# Patient Record
Sex: MALE | Race: WHITE | NOT HISPANIC OR LATINO | URBAN - METROPOLITAN AREA
[De-identification: names, ages, dates, MRNs, and addresses within clinical notes are randomized per-mention and may not be internally consistent; named-entity substitution may affect disease eponyms.]

---

## 2017-07-14 ENCOUNTER — APPOINTMENT (OUTPATIENT)
Dept: URBAN - METROPOLITAN AREA CLINIC 200 | Age: 52
Setting detail: DERMATOLOGY
End: 2017-07-25

## 2017-07-14 DIAGNOSIS — L57.8 OTHER SKIN CHANGES DUE TO CHRONIC EXPOSURE TO NONIONIZING RADIATION: ICD-10-CM

## 2017-07-14 PROBLEM — L20.84 INTRINSIC (ALLERGIC) ECZEMA: Status: ACTIVE | Noted: 2017-07-14

## 2017-07-14 PROBLEM — D23.5 OTHER BENIGN NEOPLASM OF SKIN OF TRUNK: Status: ACTIVE | Noted: 2017-07-14

## 2017-07-14 PROCEDURE — OTHER COUNSELING: OTHER

## 2017-07-14 PROCEDURE — 99202 OFFICE O/P NEW SF 15 MIN: CPT

## 2017-07-14 ASSESSMENT — LOCATION SIMPLE DESCRIPTION DERM: LOCATION SIMPLE: TRAPEZIAL NECK

## 2017-07-14 ASSESSMENT — LOCATION ZONE DERM: LOCATION ZONE: NECK

## 2017-07-14 ASSESSMENT — LOCATION DETAILED DESCRIPTION DERM: LOCATION DETAILED: MID TRAPEZIAL NECK

## 2018-04-26 ENCOUNTER — TELEPHONE (OUTPATIENT)
Dept: FAMILY MEDICINE | Facility: CLINIC | Age: 53
End: 2018-04-26

## 2018-04-26 NOTE — TELEPHONE ENCOUNTER
cialis was prescribed and ins is not covering  The ins told him theat viagra would be covered. Can we call into pharmacy      [phone]-[phone]

## 2018-04-26 NOTE — TELEPHONE ENCOUNTER
Called patient and lmom that this medication  Will have to wait until dr johnson gets back in the office on Monday

## 2018-05-02 ENCOUNTER — TELEPHONE (OUTPATIENT)
Dept: FAMILY MEDICINE | Facility: CLINIC | Age: 53
End: 2018-05-02

## 2018-05-02 RX ORDER — SILDENAFIL 100 MG/1
100 TABLET, FILM COATED ORAL DAILY PRN
Qty: 10 TABLET | Refills: 5 | Status: SHIPPED | OUTPATIENT
Start: 2018-05-02 | End: 2019-07-02

## 2018-05-02 NOTE — TELEPHONE ENCOUNTER
cialis was not covered  With his ins he [facility]

## 2018-05-02 NOTE — TELEPHONE ENCOUNTER
I sent viagra to pharmacy. I rx 100 mg but 50 mg may be effective and he can cut pill in 1/2 and have less expense that way.

## 2018-07-20 ENCOUNTER — APPOINTMENT (OUTPATIENT)
Dept: URBAN - METROPOLITAN AREA CLINIC 200 | Age: 53
Setting detail: DERMATOLOGY
End: 2018-07-20

## 2018-07-20 DIAGNOSIS — B07.8 OTHER VIRAL WARTS: ICD-10-CM

## 2018-07-20 DIAGNOSIS — L57.8 OTHER SKIN CHANGES DUE TO CHRONIC EXPOSURE TO NONIONIZING RADIATION: ICD-10-CM

## 2018-07-20 PROCEDURE — 17110 DESTRUCT B9 LESION 1-14: CPT

## 2018-07-20 PROCEDURE — 99213 OFFICE O/P EST LOW 20 MIN: CPT | Mod: 25

## 2018-07-20 PROCEDURE — OTHER LIQUID NITROGEN: OTHER

## 2018-07-20 PROCEDURE — OTHER COUNSELING: OTHER

## 2018-07-20 ASSESSMENT — LOCATION ZONE DERM
LOCATION ZONE: LEG
LOCATION ZONE: TRUNK

## 2018-07-20 ASSESSMENT — LOCATION DETAILED DESCRIPTION DERM
LOCATION DETAILED: LEFT MEDIAL SUPERIOR CHEST
LOCATION DETAILED: LEFT ANTERIOR PROXIMAL THIGH

## 2018-07-20 ASSESSMENT — LOCATION SIMPLE DESCRIPTION DERM
LOCATION SIMPLE: CHEST
LOCATION SIMPLE: LEFT THIGH

## 2018-07-20 NOTE — PROCEDURE: LIQUID NITROGEN
Number Of Freeze-Thaw Cycles: 2 freeze-thaw cycles
Pared With?: Dermablade
Post-Care Instructions: I reviewed with the patient in detail post-care instructions. Patient is to wear sunprotection, and avoid picking at any of the treated lesions. Pt may apply Vaseline to crusted or scabbing areas.
Render Post-Care Instructions In Note?: no
Consent: The patient's consent was obtained including but not limited to risks of crusting, scabbing, blistering, scarring, darker or lighter pigmentary change, recurrence, incomplete removal and infection.
Medical Necessity Information: It is in your best interest to select a reason for this procedure from the list below. All of these items fulfill various CMS LCD requirements except the new and changing color options.
Medical Necessity Clause: This procedure was medically necessary because the lesions that were treated were: causing pain
Include Z78.9 (Other Specified Conditions Influencing Health Status) As An Associated Diagnosis?: Yes
Detail Level: Detailed

## 2018-09-24 ENCOUNTER — TELEPHONE (OUTPATIENT)
Dept: FAMILY MEDICINE | Facility: CLINIC | Age: 53
End: 2018-09-24

## 2018-09-24 RX ORDER — VALACYCLOVIR HYDROCHLORIDE 1 G/1
TABLET, FILM COATED ORAL
Qty: 16 TABLET | Refills: 2 | Status: SHIPPED | OUTPATIENT
Start: 2018-09-24 | End: 2018-10-23 | Stop reason: SDUPTHER

## 2018-10-23 ENCOUNTER — OFFICE VISIT (OUTPATIENT)
Dept: FAMILY MEDICINE | Facility: CLINIC | Age: 53
End: 2018-10-23
Payer: COMMERCIAL

## 2018-10-23 VITALS
WEIGHT: 203 LBS | HEART RATE: 67 BPM | DIASTOLIC BLOOD PRESSURE: 70 MMHG | TEMPERATURE: 98.2 F | OXYGEN SATURATION: 97 % | SYSTOLIC BLOOD PRESSURE: 116 MMHG

## 2018-10-23 DIAGNOSIS — Z23 NEED FOR PROPHYLACTIC VACCINATION AND INOCULATION AGAINST INFLUENZA: Primary | ICD-10-CM

## 2018-10-23 DIAGNOSIS — B00.1 COLD SORE: ICD-10-CM

## 2018-10-23 DIAGNOSIS — E78.00 HYPERCHOLESTEROLEMIA: ICD-10-CM

## 2018-10-23 DIAGNOSIS — N52.8 OTHER MALE ERECTILE DYSFUNCTION: ICD-10-CM

## 2018-10-23 PROCEDURE — 99213 OFFICE O/P EST LOW 20 MIN: CPT | Performed by: FAMILY MEDICINE

## 2018-10-23 RX ORDER — SILDENAFIL 100 MG/1
TABLET, FILM COATED ORAL
Refills: 5 | COMMUNITY
Start: 2018-10-03 | End: 2018-10-23 | Stop reason: SDUPTHER

## 2018-10-23 RX ORDER — SILDENAFIL 100 MG/1
100 TABLET, FILM COATED ORAL DAILY PRN
Qty: 10 TABLET | Refills: 5 | Status: SHIPPED | OUTPATIENT
Start: 2018-10-23 | End: 2021-10-14

## 2018-10-23 RX ORDER — ACETAMINOPHEN 500 MG
1 TABLET ORAL DAILY
COMMUNITY
Start: 2016-05-02 | End: 2023-04-20

## 2018-10-23 RX ORDER — ACETAMINOPHEN 500 MG
5 TABLET ORAL NIGHTLY
COMMUNITY
Start: 2015-07-09

## 2018-10-23 RX ORDER — OMEGA-3/DHA/EPA/FISH OIL 300-1000MG
1 CAPSULE,DELAYED RELEASE (ENTERIC COATED) ORAL DAILY
COMMUNITY
Start: 2016-05-02 | End: 2023-04-20

## 2018-10-23 RX ORDER — VALACYCLOVIR HYDROCHLORIDE 1 G/1
2000 TABLET, FILM COATED ORAL 2 TIMES DAILY
Qty: 16 TABLET | Refills: 2
Start: 2018-10-23 | End: 2023-04-20

## 2018-10-23 ASSESSMENT — ENCOUNTER SYMPTOMS
FATIGUE: 0
SHORTNESS OF BREATH: 0

## 2018-10-23 NOTE — PROGRESS NOTES
Patient ID: Des Moran is a 53 y.o. male.        Subjective     F/u med check. Needs refill. Weight fluctuates. Needs refill viagra. Working fine. And valtrex once every few months for cold sore. [place] in de[place] family trip.           The following have been reviewed and updated as appropriate in this visit:  Allergies  Meds  Problems       Patient Active Problem List   Diagnosis   • Seasonal allergies   • Hypercholesterolemia   • Other male erectile dysfunction   • Cold sore       Current Outpatient Prescriptions:   •  cholecalciferol, vitamin D3, 5,000 unit tablet, Take 1 tablet by mouth daily., Disp: , Rfl:   •  melatonin 5 mg tablet, Take 5 mg by mouth nightly., Disp: , Rfl:   •  multivitamin capsule, No SIG Entered, Disp: , Rfl:   •  omega 3-dha-epa-fish oil (FISH OIL) 300-1,000 mg capsule,delayed release(DR/EC), Take 1 capsule by mouth daily., Disp: , Rfl:   •  sildenafil (VIAGRA) 100 mg tablet, Take 1 tablet (100 mg total) by mouth daily as needed for erectile dysfunction., Disp: 10 tablet, Rfl: 5  •  valACYclovir (VALTREX) 1 gram tablet, Take 2 tablets (2,000 mg total) by mouth 2 (two) times a day., Disp: 16 tablet, Rfl: 2    Allergies   Allergen Reactions   • No Known Allergies      Review of Systems   Constitutional: Negative for fatigue.   Respiratory: Negative for shortness of breath.    Cardiovascular: Negative for chest pain.         Objective     Vitals:    10/23/18 0924   BP: 116/70   BP Location: Left upper arm   Patient Position: Sitting   Pulse: 67   Temp: 36.8 °C (98.2 °F)   TempSrc: Oral   SpO2: 97%   Weight: 92.1 kg (203 lb)     Physical Exam   Constitutional: He appears well-developed and well-nourished.   HENT:   Head: Normocephalic.   Mouth/Throat: Oropharynx is clear and moist.   Eyes: Pupils are equal, round, and reactive to light.   Neck: No thyromegaly present.   Cardiovascular: Normal rate, regular rhythm, normal heart sounds and intact distal pulses.    Pulmonary/Chest:  Effort normal and breath sounds normal.   Lymphadenopathy:     He has no cervical adenopathy.   Nursing note and vitals reviewed.      Assessment/Plan       Problem List Items Addressed This Visit        Other    Hypercholesterolemia    Relevant Medications    omega 3-dha-epa-fish oil (FISH OIL) 300-1,000 mg capsule,delayed release(DR/EC)    Other male erectile dysfunction    Relevant Medications    sildenafil (VIAGRA) 100 mg tablet    Cold sore    Relevant Medications    valACYclovir (VALTREX) 1 gram tablet      Other Visit Diagnoses     Need for prophylactic vaccination and inoculation against influenza    -  Primary      next week do lipid panel, cmp and flu shot

## 2019-07-02 ENCOUNTER — OFFICE VISIT (OUTPATIENT)
Dept: FAMILY MEDICINE | Facility: CLINIC | Age: 54
End: 2019-07-02
Payer: COMMERCIAL

## 2019-07-02 VITALS
BODY MASS INDEX: 27.55 KG/M2 | WEIGHT: 196.8 LBS | HEART RATE: 76 BPM | RESPIRATION RATE: 12 BRPM | DIASTOLIC BLOOD PRESSURE: 74 MMHG | TEMPERATURE: 98.5 F | SYSTOLIC BLOOD PRESSURE: 106 MMHG | HEIGHT: 71 IN

## 2019-07-02 DIAGNOSIS — Z00.00 WELL ADULT EXAM: Primary | ICD-10-CM

## 2019-07-02 DIAGNOSIS — N52.8 OTHER MALE ERECTILE DYSFUNCTION: ICD-10-CM

## 2019-07-02 DIAGNOSIS — E78.00 HYPERCHOLESTEROLEMIA: ICD-10-CM

## 2019-07-02 PROCEDURE — 99396 PREV VISIT EST AGE 40-64: CPT | Performed by: FAMILY MEDICINE

## 2019-07-02 RX ORDER — TADALAFIL 5 MG/1
5 TABLET ORAL DAILY
Qty: 30 TABLET | Refills: 11 | Status: SHIPPED | OUTPATIENT
Start: 2019-07-02 | End: 2023-04-20

## 2019-07-02 NOTE — PROGRESS NOTES
"   Patient ID: Des Moran is a 53 y.o. male.        Subjective     Wellness  Diet: reduced portions  Exercise: cardio /weights 3x/week and walking on there days.  c scope 6/2017  Doing mobility exercises.  Nocturia x 1 most nights. Viagra has been less effective. No incomplete voiding.           The following have been reviewed and updated as appropriate in this visit:  Tobacco  Med Hx  Surg Hx  Fam Hx  Soc Hx      Patient Active Problem List   Diagnosis   • Seasonal allergies   • Hypercholesterolemia   • Other male erectile dysfunction   • Cold sore       Outpatient Encounter Prescriptions as of 7/2/2019:   •  cholecalciferol, vitamin D3, 5,000 unit tablet, Take 1 tablet by mouth daily.  •  melatonin 5 mg tablet, Take 5 mg by mouth nightly.  •  multivitamin capsule, No SIG Entered  •  omega 3-dha-epa-fish oil (FISH OIL) 300-1,000 mg capsule,delayed release(DR/EC), Take 1 capsule by mouth daily.  •  sildenafil (VIAGRA) 100 mg tablet, Take 1 tablet (100 mg total) by mouth daily as needed for erectile dysfunction.  •  valACYclovir (VALTREX) 1 gram tablet, Take 2 tablets (2,000 mg total) by mouth 2 (two) times a day.  •  tadalafil (CIALIS) 5 mg tablet, Take 1 tablet (5 mg total) by mouth daily.  •  [DISCONTINUED] sildenafil (VIAGRA) 100 mg tablet, Take 1 tablet (100 mg total) by mouth daily as needed for erectile dysfunction.    Allergies   Allergen Reactions   • No Known Allergies      Review of Systems   Gastrointestinal:        BM more frequent but normal consistency   All other systems reviewed and are negative.        Objective     Vitals:    07/02/19 1052   BP: 106/74   BP Location: Left upper arm   Patient Position: Sitting   Pulse: 76   Resp: 12   Temp: 36.9 °C (98.5 °F)   TempSrc: Oral   Weight: 89.3 kg (196 lb 12.8 oz)   Height: 1.791 m (5' 10.5\")     Physical Exam   Constitutional: He appears well-developed and well-nourished.   HENT:   Head: Normocephalic.   Right Ear: Tympanic membrane and external ear " normal.   Left Ear: Tympanic membrane and external ear normal.   Mouth/Throat: Oropharynx is clear and moist.   Eyes: Pupils are equal, round, and reactive to light. Conjunctivae are normal.   Neck: Normal range of motion. No thyromegaly present.   Cardiovascular: Normal rate, regular rhythm, normal heart sounds and intact distal pulses.    No murmur heard.  Pulmonary/Chest: Effort normal and breath sounds normal. He has no wheezes.   Abdominal: Soft. Bowel sounds are normal. He exhibits no mass. There is no tenderness.   Musculoskeletal: He exhibits no edema or deformity.   Lymphadenopathy:     He has no cervical adenopathy.   Neurological: No sensory deficit. Coordination normal.   Skin: Skin is warm. Capillary refill takes less than 2 seconds. No rash noted. No erythema.   Psychiatric: He has a normal mood and affect.   Nursing note and vitals reviewed.      Assessment/Plan       Problem List Items Addressed This Visit        Endocrine/Metabolic    Hypercholesterolemia       Other    Other male erectile dysfunction     Switch to daily cialis         Relevant Orders    Testosterone, free, total      Other Visit Diagnoses     Well adult exam    -  Primary    Relevant Orders    Lipid panel    Testosterone, free, total    TSH 3rd Generation    CBC and Differential    Comprehensive metabolic panel    PSA      Exercise cardio increase  Healthy habits to continue

## 2019-08-23 ENCOUNTER — OFFICE VISIT (OUTPATIENT)
Dept: FAMILY MEDICINE | Facility: CLINIC | Age: 54
End: 2019-08-23
Payer: COMMERCIAL

## 2019-08-23 VITALS
BODY MASS INDEX: 28.14 KG/M2 | HEART RATE: 67 BPM | WEIGHT: 201 LBS | RESPIRATION RATE: 16 BRPM | HEIGHT: 71 IN | SYSTOLIC BLOOD PRESSURE: 142 MMHG | DIASTOLIC BLOOD PRESSURE: 88 MMHG | TEMPERATURE: 97.8 F

## 2019-08-23 DIAGNOSIS — S61.316A LACERATION OF RIGHT LITTLE FINGER WITHOUT FOREIGN BODY WITH DAMAGE TO NAIL, INITIAL ENCOUNTER: Primary | ICD-10-CM

## 2019-08-23 PROCEDURE — 99213 OFFICE O/P EST LOW 20 MIN: CPT | Performed by: FAMILY MEDICINE

## 2019-08-23 RX ORDER — CEPHALEXIN 500 MG/1
500 CAPSULE ORAL 4 TIMES DAILY
Qty: 20 CAPSULE | Refills: 0 | Status: SHIPPED | OUTPATIENT
Start: 2019-08-23 | End: 2019-08-28

## 2019-08-23 ASSESSMENT — ENCOUNTER SYMPTOMS
WEAKNESS: 0
NUMBNESS: 0

## 2019-08-23 NOTE — PROGRESS NOTES
"   Patient ID: Des Moran is a 53 y.o. male.        Subjective     Here for wound check/suture removal. Right distal 5th digit. Smashed it between 75 pound weight and floor. Finished last abx yesterday.      Suture / Staple Removal           The following have been reviewed and updated as appropriate in this visit:  Allergies  Problems       Patient Active Problem List   Diagnosis   • Seasonal allergies   • Hypercholesterolemia   • Other male erectile dysfunction   • Cold sore       Outpatient Encounter Prescriptions as of 2019:   •  cholecalciferol, vitamin D3, 5,000 unit tablet, Take 1 tablet by mouth daily.  •  melatonin 5 mg tablet, Take 5 mg by mouth nightly.  •  multivitamin capsule, No SIG Entered  •  omega 3-dha-epa-fish oil (FISH OIL) 300-1,000 mg capsule,delayed release(DR/EC), Take 1 capsule by mouth daily.  •  sildenafil (VIAGRA) 100 mg tablet, Take 1 tablet (100 mg total) by mouth daily as needed for erectile dysfunction.  •  valACYclovir (VALTREX) 1 gram tablet, Take 2 tablets (2,000 mg total) by mouth 2 (two) times a day.  •  [] cephalexin (KEFLEX) 500 mg capsule, Take 1 capsule (500 mg total) by mouth 4 (four) times a day for 5 days.  •  tadalafil (CIALIS) 5 mg tablet, Take 1 tablet (5 mg total) by mouth daily.    Allergies   Allergen Reactions   • No Known Allergies      Review of Systems   Neurological: Negative for weakness and numbness.         Objective     Vitals:    19 1133   BP: (!) 142/88   BP Location: Left upper arm   Patient Position: Sitting   Pulse: 67   Resp: 16   Temp: 36.6 °C (97.8 °F)   TempSrc: Oral   Weight: 91.2 kg (201 lb)   Height: 1.791 m (5' 10.5\")     Physical Exam   Constitutional: He appears well-developed and well-nourished.   Musculoskeletal:   Right 5th digit with edema and nail is loose. Has some ecchymosis under nail    Skin:   L shaped laceration along radial side of distal digit lateral to nail. Some avulsion of nail. Has serosanguinous drainage. " At L corner has some duskiness of skin. Mild proximal redness   Nursing note and vitals reviewed.      Assessment/Plan       Problem List Items Addressed This Visit     None      Visit Diagnoses     Laceration of right little finger without foreign body with damage to nail, initial encounter    -  Primary      will place on keflex. Return on 27th for suture removal. Not healed yet.

## 2019-08-27 ENCOUNTER — OFFICE VISIT (OUTPATIENT)
Dept: FAMILY MEDICINE | Facility: CLINIC | Age: 54
End: 2019-08-27
Payer: COMMERCIAL

## 2019-08-27 VITALS
BODY MASS INDEX: 28.5 KG/M2 | RESPIRATION RATE: 18 BRPM | DIASTOLIC BLOOD PRESSURE: 82 MMHG | WEIGHT: 203.6 LBS | HEART RATE: 72 BPM | SYSTOLIC BLOOD PRESSURE: 124 MMHG | HEIGHT: 71 IN | TEMPERATURE: 98.3 F

## 2019-08-27 DIAGNOSIS — S61.316A LACERATION OF RIGHT LITTLE FINGER WITHOUT FOREIGN BODY WITH DAMAGE TO NAIL, INITIAL ENCOUNTER: Primary | ICD-10-CM

## 2019-08-27 DIAGNOSIS — Z48.02 VISIT FOR SUTURE REMOVAL: ICD-10-CM

## 2019-08-27 PROCEDURE — 99213 OFFICE O/P EST LOW 20 MIN: CPT | Performed by: FAMILY MEDICINE

## 2019-08-27 ASSESSMENT — ENCOUNTER SYMPTOMS: WOUND: 1

## 2019-08-27 NOTE — PROGRESS NOTES
"   Patient ID: Des Moran is a 53 y.o. male.        Subjective     HPI    Patient presents for suture removal for laceration on right distal 5th digit. Has taken keflex since Saturday. Edema has improved. Pain only if he bumps it. No drainage. Pt continues with keflex.      The following have been reviewed and updated as appropriate in this visit:       Patient Active Problem List   Diagnosis   • Seasonal allergies   • Hypercholesterolemia   • Other male erectile dysfunction   • Cold sore       Outpatient Encounter Prescriptions as of 8/27/2019:   •  cephalexin (KEFLEX) 500 mg capsule, Take 1 capsule (500 mg total) by mouth 4 (four) times a day for 5 days.  •  cholecalciferol, vitamin D3, 5,000 unit tablet, Take 1 tablet by mouth daily.  •  melatonin 5 mg tablet, Take 5 mg by mouth nightly.  •  multivitamin capsule, No SIG Entered  •  omega 3-dha-epa-fish oil (FISH OIL) 300-1,000 mg capsule,delayed release(DR/EC), Take 1 capsule by mouth daily.  •  sildenafil (VIAGRA) 100 mg tablet, Take 1 tablet (100 mg total) by mouth daily as needed for erectile dysfunction.  •  tadalafil (CIALIS) 5 mg tablet, Take 1 tablet (5 mg total) by mouth daily.  •  valACYclovir (VALTREX) 1 gram tablet, Take 2 tablets (2,000 mg total) by mouth 2 (two) times a day.    Allergies   Allergen Reactions   • No Known Allergies      Review of Systems   Skin: Positive for wound (right distal 5th digit).   All other systems reviewed and are negative.        Objective     Vitals:    08/27/19 1035   BP: 124/82   BP Location: Left upper arm   Patient Position: Sitting   Pulse: 72   Resp: 18   Temp: 36.8 °C (98.3 °F)   TempSrc: Oral   Weight: 92.4 kg (203 lb 9.6 oz)   Height: 1.791 m (5' 10.5\")     Physical Exam   Musculoskeletal: He exhibits no edema or deformity.   Skin:   Right fifth digit with significantly less edema and no serosanguineous drainage.  The wound is clean, dry and mostly intact except for the junction between skin and nail border.  5 " mattress sutures are present.   Nursing note and vitals reviewed.      Assessment/Plan       Problem List Items Addressed This Visit     None      Visit Diagnoses     Laceration of right little finger without foreign body with damage to nail, initial encounter    -  Primary    Visit for suture removal          Area was cleaned with alcohol and using forceps and scissors the sutures were carefully removed.  There was a small amount of oozing of blood along the nail border but hemostasis achieved.  Steri-Strips applied.  Advised on excess bleeding due to laceration of the nail in the corner of trauma.  Advised to stay out of still water, running water is okay.  Complete antibiotic.     By signing my name below, I, Stacia Costa, attest that this documentation has been prepared under the direction and in the presence of Froylan Peguero MD      8/27/2019 11:04 AM    I, Froylan Peguero, personally performed the services described in this documentation, as documented by the scribe in my presence, and it is both accurate and complete.  8/27/2019 11:06 AM

## 2019-09-10 ENCOUNTER — OFFICE VISIT (OUTPATIENT)
Dept: FAMILY MEDICINE | Facility: CLINIC | Age: 54
End: 2019-09-10
Payer: COMMERCIAL

## 2019-09-10 VITALS
RESPIRATION RATE: 16 BRPM | SYSTOLIC BLOOD PRESSURE: 130 MMHG | HEART RATE: 80 BPM | DIASTOLIC BLOOD PRESSURE: 88 MMHG | BODY MASS INDEX: 28.61 KG/M2 | WEIGHT: 204.4 LBS | TEMPERATURE: 98.2 F | HEIGHT: 71 IN

## 2019-09-10 DIAGNOSIS — S61.316D LACERATION OF RIGHT LITTLE FINGER WITHOUT FOREIGN BODY WITH DAMAGE TO NAIL, SUBSEQUENT ENCOUNTER: Primary | ICD-10-CM

## 2019-09-10 DIAGNOSIS — L03.019 FELON OF FINGER: ICD-10-CM

## 2019-09-10 PROCEDURE — 99213 OFFICE O/P EST LOW 20 MIN: CPT | Performed by: FAMILY MEDICINE

## 2019-09-10 RX ORDER — MUPIROCIN 20 MG/G
1 OINTMENT TOPICAL 3 TIMES DAILY
Qty: 22 G | Refills: 1 | Status: SHIPPED | OUTPATIENT
Start: 2019-09-10 | End: 2019-09-17

## 2019-09-10 RX ORDER — CEPHALEXIN 500 MG/1
500 CAPSULE ORAL 4 TIMES DAILY
Qty: 28 CAPSULE | Refills: 0 | Status: SHIPPED | OUTPATIENT
Start: 2019-09-10 | End: 2019-09-17

## 2019-09-10 ASSESSMENT — ENCOUNTER SYMPTOMS: FEVER: 0

## 2019-09-10 NOTE — PROGRESS NOTES
Patient ID: Des Moran is a 53 y.o. male.        Subjective     HPI  Patient presents for a wound check on the laceration of the R distal 5th digit. Last seen by me on  for suture removal. Reports an odor from the fingerover past few days. Small amount pus initially a few days ago close to the laceration. No longer having drainage. Nail fell off today. It does feel less tender. No X-ray of injury originally as decided on by pt.      The following have been reviewed and updated as appropriate in this visit:  Allergies  Meds  Problems       Patient Active Problem List   Diagnosis   • Seasonal allergies   • Hypercholesterolemia   • Other male erectile dysfunction   • Cold sore       Outpatient Encounter Prescriptions as of 9/10/2019:   •  cholecalciferol, vitamin D3, 5,000 unit tablet, Take 1 tablet by mouth daily.  •  melatonin 5 mg tablet, Take 5 mg by mouth nightly.  •  multivitamin capsule, No SIG Entered  •  omega 3-dha-epa-fish oil (FISH OIL) 300-1,000 mg capsule,delayed release(DR/EC), Take 1 capsule by mouth daily.  •  sildenafil (VIAGRA) 100 mg tablet, Take 1 tablet (100 mg total) by mouth daily as needed for erectile dysfunction.  •  valACYclovir (VALTREX) 1 gram tablet, Take 2 tablets (2,000 mg total) by mouth 2 (two) times a day.  •  [] cephalexin (KEFLEX) 500 mg capsule, Take 1 capsule (500 mg total) by mouth 4 (four) times a day for 5 days.  •  cephalexin (KEFLEX) 500 mg capsule, Take 1 capsule (500 mg total) by mouth 4 (four) times a day for 7 days.  •  mupirocin (BACTROBAN) 2 % ointment, Apply 1 application topically 3 (three) times a day for 7 days.  •  tadalafil (CIALIS) 5 mg tablet, Take 1 tablet (5 mg total) by mouth daily.    Allergies   Allergen Reactions   • No Known Allergies      Review of Systems   Constitutional: Negative for fever.   Musculoskeletal:        Finger mobility normal         Objective     Vitals:    09/10/19 1258   BP: 130/88   BP Location: Left upper arm  "  Patient Position: Sitting   Pulse: 80   Resp: 16   Temp: 36.8 °C (98.2 °F)   TempSrc: Oral   Weight: 92.7 kg (204 lb 6.4 oz)   Height: 1.791 m (5' 10.5\")     Physical Exam   Constitutional: He appears well-developed and well-nourished.   Musculoskeletal: He exhibits no edema.   Length of 5th digit appears longer on right but also has distal edema.   Skin: Laceration noted.   R distal 5th digit nail is absent. He has some peeling skin around the nail distal finger. The medial and lateral cuticle is white as well as areas of nail base.. Base of nail bed has a small amount of granulation tissue but areas of white skin. Distal tip of finger is tender and fluctuant. Open wound that extends towards medial tip with exudate. No purulent material is expressed.   Nursing note and vitals reviewed.      Assessment/Plan       Problem List Items Addressed This Visit     None      Visit Diagnoses     Laceration of right little finger without foreign body with damage to nail, subsequent encounter    -  Primary    Relevant Orders    X-RAY FINGER RIGHT 2+ VIEWS    Ambulatory referral to Plastic Surgery    Felon of finger        Relevant Medications    cephalexin (KEFLEX) 500 mg capsule    Other Relevant Orders    X-RAY FINGER RIGHT 2+ VIEWS    Ambulatory referral to Plastic Surgery        Prescribing another 7-course of Keflex.  Soak finger in warm water 3x a day to aid if there is any purulent material to drain.   Apply topical bactroban 3x a day.  Ordered x-ray of R distal 5th digit to be completed ASAP. Explained need to evaluate bone and determine if fracture.   Follow with Plastic and Reconstructive Surgery by the end of the week.   Declines flu shot today.     By signing my name below, I, Stacia Costa, attest that this documentation has been prepared under the direction and in the presence of Froylan Peguero MD      9/10/2019 1:32 PM    I, Froylan Peguero, personally performed the services described in this " documentation, as documented by the scribe in my presence, and it is both accurate and complete.  9/10/2019 1:33 PM

## 2019-10-28 ENCOUNTER — APPOINTMENT (OUTPATIENT)
Dept: URBAN - METROPOLITAN AREA CLINIC 200 | Age: 54
Setting detail: DERMATOLOGY
End: 2019-10-29

## 2019-10-28 DIAGNOSIS — L57.8 OTHER SKIN CHANGES DUE TO CHRONIC EXPOSURE TO NONIONIZING RADIATION: ICD-10-CM

## 2019-10-28 PROBLEM — D23.5 OTHER BENIGN NEOPLASM OF SKIN OF TRUNK: Status: ACTIVE | Noted: 2019-10-28

## 2019-10-28 PROCEDURE — 99213 OFFICE O/P EST LOW 20 MIN: CPT

## 2019-10-28 PROCEDURE — OTHER COUNSELING: OTHER

## 2019-10-28 PROCEDURE — OTHER MIPS QUALITY: OTHER

## 2019-10-28 ASSESSMENT — LOCATION DETAILED DESCRIPTION DERM: LOCATION DETAILED: LEFT MEDIAL SUPERIOR CHEST

## 2019-10-28 ASSESSMENT — LOCATION ZONE DERM: LOCATION ZONE: TRUNK

## 2019-10-28 ASSESSMENT — LOCATION SIMPLE DESCRIPTION DERM: LOCATION SIMPLE: CHEST

## 2020-02-20 ENCOUNTER — TELEPHONE (OUTPATIENT)
Dept: FAMILY MEDICINE | Facility: CLINIC | Age: 55
End: 2020-02-20

## 2020-02-24 LAB
BASOPHILS # BLD AUTO: 0 X10E3/UL (ref 0–0.2)
BASOPHILS NFR BLD AUTO: 1 %
EOSINOPHIL # BLD AUTO: 0.2 X10E3/UL (ref 0–0.4)
EOSINOPHIL NFR BLD AUTO: 4 %
ERYTHROCYTE [DISTWIDTH] IN BLOOD BY AUTOMATED COUNT: 12.8 % (ref 11.6–15.4)
HCT VFR BLD AUTO: 45.2 % (ref 37.5–51)
HGB BLD-MCNC: 15.7 G/DL (ref 13–17.7)
IMM GRANULOCYTES # BLD AUTO: 0 X10E3/UL (ref 0–0.1)
IMM GRANULOCYTES NFR BLD AUTO: 0 %
LYMPHOCYTES # BLD AUTO: 1.8 X10E3/UL (ref 0.7–3.1)
LYMPHOCYTES NFR BLD AUTO: 46 %
MCH RBC QN AUTO: 32.3 PG (ref 26.6–33)
MCHC RBC AUTO-ENTMCNC: 34.7 G/DL (ref 31.5–35.7)
MCV RBC AUTO: 93 FL (ref 79–97)
MONOCYTES # BLD AUTO: 0.4 X10E3/UL (ref 0.1–0.9)
MONOCYTES NFR BLD AUTO: 10 %
NEUTROPHILS # BLD AUTO: 1.5 X10E3/UL (ref 1.4–7)
NEUTROPHILS NFR BLD AUTO: 39 %
PLATELET # BLD AUTO: 165 X10E3/UL (ref 150–450)
RBC # BLD AUTO: 4.86 X10E6/UL (ref 4.14–5.8)
WBC # BLD AUTO: 3.9 X10E3/UL (ref 3.4–10.8)

## 2020-02-25 LAB
ALBUMIN SERPL-MCNC: 4.5 G/DL (ref 3.8–4.9)
ALBUMIN/GLOB SERPL: 2.4 {RATIO} (ref 1.2–2.2)
ALP SERPL-CCNC: 41 IU/L (ref 39–117)
ALT SERPL-CCNC: 28 IU/L (ref 0–44)
AST SERPL-CCNC: 27 IU/L (ref 0–40)
BILIRUB SERPL-MCNC: 0.5 MG/DL (ref 0–1.2)
BUN SERPL-MCNC: 13 MG/DL (ref 6–24)
BUN/CREAT SERPL: 11 (ref 9–20)
CALCIUM SERPL-MCNC: 9.5 MG/DL (ref 8.7–10.2)
CHLORIDE SERPL-SCNC: 98 MMOL/L (ref 96–106)
CHOLEST SERPL-MCNC: 252 MG/DL (ref 100–199)
CO2 SERPL-SCNC: 26 MMOL/L (ref 20–29)
CREAT SERPL-MCNC: 1.15 MG/DL (ref 0.76–1.27)
GLOBULIN SER CALC-MCNC: 1.9 G/DL (ref 1.5–4.5)
GLUCOSE SERPL-MCNC: 92 MG/DL (ref 65–99)
HDLC SERPL-MCNC: 66 MG/DL
LAB CORP EGFR IF AFRICN AM: 84 ML/MIN/1.73
LAB CORP EGFR IF NONAFRICN AM: 72 ML/MIN/1.73
LDLC SERPL CALC-MCNC: 160 MG/DL (ref 0–99)
POTASSIUM SERPL-SCNC: 4.6 MMOL/L (ref 3.5–5.2)
PROT SERPL-MCNC: 6.4 G/DL (ref 6–8.5)
PSA SERPL-MCNC: 4 NG/ML (ref 0–4)
SODIUM SERPL-SCNC: 139 MMOL/L (ref 134–144)
TESTOST FREE SERPL-MCNC: 15.7 PG/ML (ref 7.2–24)
TESTOST SERPL-MCNC: 443 NG/DL (ref 264–916)
TRIGL SERPL-MCNC: 129 MG/DL (ref 0–149)
TSH SERPL DL<=0.005 MIU/L-ACNC: 2.08 UIU/ML (ref 0.45–4.5)
VLDLC SERPL CALC-MCNC: 26 MG/DL (ref 5–40)

## 2020-02-27 ENCOUNTER — TELEPHONE (OUTPATIENT)
Dept: FAMILY MEDICINE | Facility: CLINIC | Age: 55
End: 2020-02-27

## 2020-02-27 NOTE — TELEPHONE ENCOUNTER
Michelle asked me to pick to schedule appt. Pt told me he would call back to schedule as he was headed in to a meeting

## 2020-02-27 NOTE — TELEPHONE ENCOUNTER
The 10-year ASCVD risk score (Glen FERRER Jr., et al., 2013) is: 9.7%    Values used to calculate the score:      Age: 54 years      Sex: Male      Is Non- : No      Diabetic: No      Tobacco smoker: Yes      Systolic Blood Pressure: 130 mmHg      Is BP treated: No      HDL Cholesterol: 66 mg/dL      Total Cholesterol: 252 mg/dL  Cholesterol is elevated and is in range where we need to consider medication. Also his PSA although normal has had a more rapid change than I would expect over a 3 yr timeframe. Pls have him schedule a follow up appt to discuss both issues. Other labs are all normal.

## 2020-03-13 ENCOUNTER — OFFICE VISIT (OUTPATIENT)
Dept: FAMILY MEDICINE | Facility: CLINIC | Age: 55
End: 2020-03-13
Payer: COMMERCIAL

## 2020-03-13 VITALS
SYSTOLIC BLOOD PRESSURE: 140 MMHG | TEMPERATURE: 98.2 F | RESPIRATION RATE: 16 BRPM | BODY MASS INDEX: 28.61 KG/M2 | HEIGHT: 71 IN | DIASTOLIC BLOOD PRESSURE: 88 MMHG | HEART RATE: 80 BPM | WEIGHT: 204.4 LBS

## 2020-03-13 DIAGNOSIS — N52.8 OTHER MALE ERECTILE DYSFUNCTION: ICD-10-CM

## 2020-03-13 DIAGNOSIS — E78.00 HYPERCHOLESTEROLEMIA: Primary | ICD-10-CM

## 2020-03-13 DIAGNOSIS — R97.20 INCREASED PROSTATE SPECIFIC ANTIGEN (PSA) VELOCITY: ICD-10-CM

## 2020-03-13 PROCEDURE — 99213 OFFICE O/P EST LOW 20 MIN: CPT | Performed by: FAMILY MEDICINE

## 2020-03-13 RX ORDER — ROSUVASTATIN CALCIUM 10 MG/1
10 TABLET, COATED ORAL DAILY
Qty: 30 TABLET | Refills: 1 | Status: SHIPPED | OUTPATIENT
Start: 2020-03-13 | End: 2020-04-24 | Stop reason: SDUPTHER

## 2020-03-13 NOTE — PROGRESS NOTES
Patient ID: Des Moran is a 54 y.o. male.        Subjective     HPI  Here for f/u. Has upcoming appt with Dr. Cottrell urologist at [facility] for ED management. Has been on cialis but not always helpful.  The 10-year ASCVD risk score (Glen FERRER JrSybil, et al., 2013) is: 11%    Values used to calculate the score:      Age: 54 years      Sex: Male      Is Non- : No      Diabetic: No      Tobacco smoker: Yes      Systolic Blood Pressure: 140 mmHg      Is BP treated: No      HDL Cholesterol: 66 mg/dL      Total Cholesterol: 252 mg/dL  Diet has high protein diet and some eggs. Avocado, planning to add more veggies.   Takes melatonin every night.     The following have been reviewed and updated as appropriate in this visit:  Allergies  Meds  Problems       Patient Active Problem List   Diagnosis   • Seasonal allergies   • Hypercholesterolemia   • Other male erectile dysfunction   • Cold sore       Outpatient Encounter Medications as of 3/13/2020:   •  cholecalciferol, vitamin D3, 5,000 unit tablet, Take 1 tablet by mouth daily.  •  melatonin 5 mg tablet, Take 5 mg by mouth nightly.  •  multivitamin capsule, No SIG Entered  •  omega 3-dha-epa-fish oil (FISH OIL) 300-1,000 mg capsule,delayed release(DR/EC), Take 1 capsule by mouth daily.  •  sildenafil (VIAGRA) 100 mg tablet, Take 1 tablet (100 mg total) by mouth daily as needed for erectile dysfunction.  •  valACYclovir (VALTREX) 1 gram tablet, Take 2 tablets (2,000 mg total) by mouth 2 (two) times a day.  •  rosuvastatin (CRESTOR) 10 mg tablet, Take 1 tablet (10 mg total) by mouth daily.  •  tadalafil (CIALIS) 5 mg tablet, Take 1 tablet (5 mg total) by mouth daily.    Allergies   Allergen Reactions   • No Known Allergies      Review of Systems   Gastrointestinal:        Increased reflux         Objective     Vitals:    03/13/20 1314   BP: 140/88   BP Location: Left upper arm   Patient Position: Sitting   Pulse: 80   Resp: 16   Temp: 36.8 °C (98.2 °F)  "  TempSrc: Oral   Weight: 92.7 kg (204 lb 6.4 oz)   Height: 1.791 m (5' 10.5\")     Physical Exam   Constitutional: He appears well-developed and well-nourished.   Nursing note and vitals reviewed.    Recent labs reviewed    Assessment/Plan       Problem List Items Addressed This Visit     Hypercholesterolemia - Primary    Relevant Medications    rosuvastatin (CRESTOR) 10 mg tablet    Other Relevant Orders    Lipid panel    Hepatic function panel    Other male erectile dysfunction      Other Visit Diagnoses     Increased prostate specific antigen (PSA) velocity          switch cialis to daily.   Add crestor and plan labs 3 mo. Counseled on side effects.   Labs in 3 month  See Dr. Cottrell for elevated psa velocity.     "

## 2020-04-24 RX ORDER — ROSUVASTATIN CALCIUM 10 MG/1
10 TABLET, COATED ORAL DAILY
Qty: 90 TABLET | Refills: 1 | Status: SHIPPED | OUTPATIENT
Start: 2020-04-24 | End: 2020-11-10

## 2020-04-24 NOTE — TELEPHONE ENCOUNTER
Medicine Refill Request    Last Office Visit: 3/13/2020  Next Office Visit: Visit date not found        Current Outpatient Medications:   •  cholecalciferol, vitamin D3, 5,000 unit tablet, Take 1 tablet by mouth daily., Disp: , Rfl:   •  melatonin 5 mg tablet, Take 5 mg by mouth nightly., Disp: , Rfl:   •  multivitamin capsule, No SIG Entered, Disp: , Rfl:   •  omega 3-dha-epa-fish oil (FISH OIL) 300-1,000 mg capsule,delayed release(DR/EC), Take 1 capsule by mouth daily., Disp: , Rfl:   •  rosuvastatin (CRESTOR) 10 mg tablet, Take 1 tablet (10 mg total) by mouth daily., Disp: 30 tablet, Rfl: 1  •  sildenafil (VIAGRA) 100 mg tablet, Take 1 tablet (100 mg total) by mouth daily as needed for erectile dysfunction., Disp: 10 tablet, Rfl: 5  •  tadalafil (CIALIS) 5 mg tablet, Take 1 tablet (5 mg total) by mouth daily., Disp: 30 tablet, Rfl: 11  •  valACYclovir (VALTREX) 1 gram tablet, Take 2 tablets (2,000 mg total) by mouth 2 (two) times a day., Disp: 16 tablet, Rfl: 2      BP Readings from Last 3 Encounters:   03/13/20 140/88   09/10/19 130/88   08/27/19 124/82       Recent Lab results:  Lab Results   Component Value Date    CHOL 252 (H) 02/24/2020   ,   Lab Results   Component Value Date    HDL 66 02/24/2020   ,   Lab Results   Component Value Date    LDLCALC 160 (H) 02/24/2020   ,   Lab Results   Component Value Date    TRIG 129 02/24/2020        Lab Results   Component Value Date    GLUCOSE 92 02/24/2020   , No results found for: HGBA1C      Lab Results   Component Value Date    CREATININE 1.15 02/24/2020       Lab Results   Component Value Date    TSH 2.080 02/24/2020

## 2020-06-11 LAB
25(OH)D3 SERPL-MCNC: 33 NG/ML (ref 30–100)
PSA SERPL-MCNC: 3.2 NG/ML

## 2020-07-07 DIAGNOSIS — N52.8 OTHER MALE ERECTILE DYSFUNCTION: ICD-10-CM

## 2020-07-07 RX ORDER — SILDENAFIL 100 MG/1
100 TABLET, FILM COATED ORAL DAILY PRN
Qty: 10 TABLET | Refills: 5 | Status: CANCELLED | OUTPATIENT
Start: 2020-07-07

## 2020-07-07 NOTE — TELEPHONE ENCOUNTER
Medicine Refill Request    Last Office Visit: Visit date not found  Last Telemedicine Visit: Visit date not found    Next Office Visit: Visit date not found  Next Telemedicine Visit: Visit date not found     Last seen 03/30    Current Outpatient Medications:   •  cholecalciferol, vitamin D3, 5,000 unit tablet, Take 1 tablet by mouth daily., Disp: , Rfl:   •  melatonin 5 mg tablet, Take 5 mg by mouth nightly., Disp: , Rfl:   •  multivitamin capsule, No SIG Entered, Disp: , Rfl:   •  omega 3-dha-epa-fish oil (FISH OIL) 300-1,000 mg capsule,delayed release(DR/EC), Take 1 capsule by mouth daily., Disp: , Rfl:   •  rosuvastatin (CRESTOR) 10 mg tablet, Take 1 tablet (10 mg total) by mouth daily., Disp: 90 tablet, Rfl: 1  •  sildenafil (VIAGRA) 100 mg tablet, Take 1 tablet (100 mg total) by mouth daily as needed for erectile dysfunction., Disp: 10 tablet, Rfl: 5  •  tadalafil (CIALIS) 5 mg tablet, Take 1 tablet (5 mg total) by mouth daily., Disp: 30 tablet, Rfl: 11  •  valACYclovir (VALTREX) 1 gram tablet, Take 2 tablets (2,000 mg total) by mouth 2 (two) times a day., Disp: 16 tablet, Rfl: 2      BP Readings from Last 3 Encounters:   03/13/20 140/88   09/10/19 130/88   08/27/19 124/82       Recent Lab results:  Lab Results   Component Value Date    CHOL 252 (H) 02/24/2020   ,   Lab Results   Component Value Date    HDL 66 02/24/2020   ,   Lab Results   Component Value Date    LDLCALC 160 (H) 02/24/2020   ,   Lab Results   Component Value Date    TRIG 129 02/24/2020        Lab Results   Component Value Date    GLUCOSE 92 02/24/2020   , No results found for: HGBA1C      Lab Results   Component Value Date    CREATININE 1.15 02/24/2020       Lab Results   Component Value Date    TSH 2.080 02/24/2020

## 2020-07-08 RX ORDER — TADALAFIL 5 MG/1
5 TABLET ORAL DAILY
Qty: 30 TABLET | Refills: 11 | Status: CANCELLED | OUTPATIENT
Start: 2020-07-08 | End: 2020-08-07

## 2020-07-08 NOTE — TELEPHONE ENCOUNTER
Tracy, it looks like patient's urologist was the last prescriber of this medication.  Please have him call Dr. Cottrell to obtain refill.

## 2020-07-08 NOTE — TELEPHONE ENCOUNTER
Medicine Refill Request    Last Office Visit: 03/13/2020  Last Telemedicine Visit: Visit date not found    Next Office Visit: Visit date not found  Next Telemedicine Visit: Visit date not found         Current Outpatient Medications:   •  cholecalciferol, vitamin D3, 5,000 unit tablet, Take 1 tablet by mouth daily., Disp: , Rfl:   •  melatonin 5 mg tablet, Take 5 mg by mouth nightly., Disp: , Rfl:   •  multivitamin capsule, No SIG Entered, Disp: , Rfl:   •  omega 3-dha-epa-fish oil (FISH OIL) 300-1,000 mg capsule,delayed release(DR/EC), Take 1 capsule by mouth daily., Disp: , Rfl:   •  rosuvastatin (CRESTOR) 10 mg tablet, Take 1 tablet (10 mg total) by mouth daily., Disp: 90 tablet, Rfl: 1  •  sildenafil (VIAGRA) 100 mg tablet, Take 1 tablet (100 mg total) by mouth daily as needed for erectile dysfunction., Disp: 10 tablet, Rfl: 5  •  tadalafil (CIALIS) 5 mg tablet, Take 1 tablet (5 mg total) by mouth daily., Disp: 30 tablet, Rfl: 11  •  valACYclovir (VALTREX) 1 gram tablet, Take 2 tablets (2,000 mg total) by mouth 2 (two) times a day., Disp: 16 tablet, Rfl: 2      BP Readings from Last 3 Encounters:   03/13/20 140/88   09/10/19 130/88   08/27/19 124/82       Recent Lab results:  Lab Results   Component Value Date    CHOL 252 (H) 02/24/2020   ,   Lab Results   Component Value Date    HDL 66 02/24/2020   ,   Lab Results   Component Value Date    LDLCALC 160 (H) 02/24/2020   ,   Lab Results   Component Value Date    TRIG 129 02/24/2020        Lab Results   Component Value Date    GLUCOSE 92 02/24/2020   , No results found for: HGBA1C      Lab Results   Component Value Date    CREATININE 1.15 02/24/2020       Lab Results   Component Value Date    TSH 2.080 02/24/2020

## 2020-10-30 ENCOUNTER — TELEPHONE (OUTPATIENT)
Dept: PRIMARY CARE | Facility: CLINIC | Age: 55
End: 2020-10-30

## 2020-10-30 NOTE — TELEPHONE ENCOUNTER
Preop clearance appt scheduled with Dr Love for 11/23/20.  Pt is set up to have a tumor removed from his prostate on 12/21/20 with Dr Olegario Acevedo at [facility].  He will be getting an EKG, labs, and a COVID test done in about 2 weeks.  I asked him to please have copies sent to our office.    Surgical coordinator is Enedelia [phone]

## 2020-11-10 RX ORDER — ROSUVASTATIN CALCIUM 10 MG/1
TABLET, COATED ORAL
Qty: 30 TABLET | Refills: 5 | Status: SHIPPED | OUTPATIENT
Start: 2020-11-10 | End: 2021-10-14 | Stop reason: SDUPTHER

## 2020-11-10 NOTE — TELEPHONE ENCOUNTER
Medicine Refill Request    Last Office Visit: 3/13/2020  Last Telemedicine Visit: Visit date not found    Next Office Visit: 11/23/2020  Next Telemedicine Visit: Visit date not found         Current Outpatient Medications:   •  cholecalciferol, vitamin D3, 5,000 unit tablet, Take 1 tablet by mouth daily., Disp: , Rfl:   •  melatonin 5 mg tablet, Take 5 mg by mouth nightly., Disp: , Rfl:   •  multivitamin capsule, No SIG Entered, Disp: , Rfl:   •  omega 3-dha-epa-fish oil (FISH OIL) 300-1,000 mg capsule,delayed release(DR/EC), Take 1 capsule by mouth daily., Disp: , Rfl:   •  rosuvastatin (CRESTOR) 10 mg tablet, Take 1 tablet (10 mg total) by mouth daily., Disp: 90 tablet, Rfl: 1  •  sildenafil (VIAGRA) 100 mg tablet, Take 1 tablet (100 mg total) by mouth daily as needed for erectile dysfunction., Disp: 10 tablet, Rfl: 5  •  tadalafil (CIALIS) 5 mg tablet, Take 1 tablet (5 mg total) by mouth daily., Disp: 30 tablet, Rfl: 11  •  valACYclovir (VALTREX) 1 gram tablet, Take 2 tablets (2,000 mg total) by mouth 2 (two) times a day., Disp: 16 tablet, Rfl: 2      BP Readings from Last 3 Encounters:   03/13/20 140/88   09/10/19 130/88   08/27/19 124/82       Recent Lab results:  Lab Results   Component Value Date    CHOL 252 (H) 02/24/2020   ,   Lab Results   Component Value Date    HDL 66 02/24/2020   ,   Lab Results   Component Value Date    LDLCALC 160 (H) 02/24/2020   ,   Lab Results   Component Value Date    TRIG 129 02/24/2020        Lab Results   Component Value Date    GLUCOSE 92 02/24/2020   , No results found for: HGBA1C      Lab Results   Component Value Date    CREATININE 1.15 02/24/2020       Lab Results   Component Value Date    TSH 2.080 02/24/2020

## 2020-11-23 ENCOUNTER — CONSULT (OUTPATIENT)
Dept: PRIMARY CARE | Facility: CLINIC | Age: 55
End: 2020-11-23
Payer: COMMERCIAL

## 2020-11-23 VITALS
TEMPERATURE: 98.6 F | SYSTOLIC BLOOD PRESSURE: 140 MMHG | OXYGEN SATURATION: 98 % | RESPIRATION RATE: 14 BRPM | BODY MASS INDEX: 29.54 KG/M2 | HEIGHT: 71 IN | HEART RATE: 64 BPM | WEIGHT: 211 LBS | DIASTOLIC BLOOD PRESSURE: 86 MMHG

## 2020-11-23 DIAGNOSIS — R03.0 ELEVATED BP WITHOUT DIAGNOSIS OF HYPERTENSION: ICD-10-CM

## 2020-11-23 DIAGNOSIS — E78.00 HYPERCHOLESTEROLEMIA: Primary | ICD-10-CM

## 2020-11-23 DIAGNOSIS — Z01.818 PRE-OP EXAMINATION: ICD-10-CM

## 2020-11-23 DIAGNOSIS — C61 PROSTATE CANCER (CMS/HCC): ICD-10-CM

## 2020-11-23 PROCEDURE — 99214 OFFICE O/P EST MOD 30 MIN: CPT | Performed by: INTERNAL MEDICINE

## 2020-11-23 ASSESSMENT — ENCOUNTER SYMPTOMS
BRUISES/BLEEDS EASILY: 0
FEVER: 0
HEMATURIA: 0
LIGHT-HEADEDNESS: 0
VOMITING: 0
SHORTNESS OF BREATH: 0
FATIGUE: 0
APNEA: 0
NERVOUS/ANXIOUS: 0
RHINORRHEA: 0
ACTIVITY CHANGE: 0
DIARRHEA: 0
NAUSEA: 0
PALPITATIONS: 0
COUGH: 0
DYSPHORIC MOOD: 0
SINUS PAIN: 0
DIZZINESS: 0
ARTHRALGIAS: 0
CHEST TIGHTNESS: 0
HEADACHES: 0
APPETITE CHANGE: 0
WHEEZING: 0
BACK PAIN: 0
SLEEP DISTURBANCE: 0
JOINT SWELLING: 0

## 2020-11-23 ASSESSMENT — PATIENT HEALTH QUESTIONNAIRE - PHQ9: SUM OF ALL RESPONSES TO PHQ9 QUESTIONS 1 & 2: 0

## 2020-11-23 NOTE — PROGRESS NOTES
[facility]  Dr. Britni Yee MD  [address] [facility], [address]  [place] [address]  Phone: [phone]  Fax: [phone]      Visit Date: 11/23/2020    Patient ID: Des Moran is a 55 y.o. male.  Surgeon: Dr Acevedo; ATTN:  Enedelia Lei.  Facility: [facility]  Fax: [phone]    Chief Complaint: Pre-op Exam (tumor removal 12/21/2020 )      Patient Active Problem List   Diagnosis   • Seasonal allergies   • Hypercholesterolemia   • Other male erectile dysfunction   • Cold sore   • Prostate cancer (CMS/HCC)   • Elevated BP without diagnosis of hypertension   • Pre-op examination       Pt here for pre-op evaluation by Dr Acevedo.    Scheduled for LAP,PROSTATECTOMY,RADICAL,W/NERVE SPARE,INCL ROBOTIC on 12/ 21/20 by Dr Acevedo.    Denies any easy bruising. No unprovoked epistaxis, no excessive bleeding after injuries.   Based on Revised Cardiac Risk Index, no history of ischemic heart disease, heart failure, Diaibetes mellitus, CVA, Cr > 3.     Pt note:  OR scheduled 12/21/20.  Please fax the the details to [facility] at [phone], ATTN:  Enedelia Lei.           Current Outpatient Medications:   •  cholecalciferol, vitamin D3, 5,000 unit tablet, Take 1 tablet by mouth daily., Disp: , Rfl:   •  melatonin 5 mg tablet, Take 5 mg by mouth nightly., Disp: , Rfl:   •  multivitamin capsule, No SIG Entered, Disp: , Rfl:   •  omega 3-dha-epa-fish oil (FISH OIL) 300-1,000 mg capsule,delayed release(DR/EC), Take 1 capsule by mouth daily., Disp: , Rfl:   •  rosuvastatin (CRESTOR) 10 mg tablet, TAKE 1 TABLET BY MOUTH EVERY DAY, Disp: 30 tablet, Rfl: 5  •  sildenafil (VIAGRA) 100 mg tablet, Take 1 tablet (100 mg total) by mouth daily as needed for erectile dysfunction., Disp: 10 tablet, Rfl: 5  •  valACYclovir (VALTREX) 1 gram tablet, Take 2 tablets (2,000 mg total) by mouth 2 (two) times a day., Disp: 16 tablet, Rfl: 2  •  tadalafil (CIALIS) 5 mg tablet, Take 1 tablet (5 mg  "total) by mouth daily., Disp: 30 tablet, Rfl: 11    Allergies   Allergen Reactions   • No Known Allergies        No past medical history on file.    No past surgical history on file.    Social History     Tobacco Use   • Smoking status: Current Some Day Smoker     Types: Cigars   • Smokeless tobacco: Never Used   • Tobacco comment: RARE cigar   Substance Use Topics   • Alcohol use: Yes     Alcohol/week: 10.0 standard drinks     Types: 10 Cans of beer per week   • Drug use: No       Family History   Problem Relation Age of Onset   • Breast cancer Biological Mother    • Hypertension Biological Mother    • Prostate cancer Biological Father    • Alcohol abuse Biological Father    • Bipolar disorder Biological Sister    • Alcohol abuse Biological Sister    • Cirrhosis Paternal Grandmother    • Heart disease Mother's Brother         The following have been reviewed and updated as appropriate in this visit:         Review of Systems   Constitutional: Negative for activity change, appetite change, fatigue and fever.   HENT: Negative for dental problem, postnasal drip, rhinorrhea, sinus pain and tinnitus.    Respiratory: Negative for apnea, cough, chest tightness, shortness of breath and wheezing.    Cardiovascular: Negative for chest pain, palpitations and leg swelling.   Gastrointestinal: Negative for diarrhea, nausea and vomiting.   Genitourinary: Negative for hematuria.   Musculoskeletal: Negative for arthralgias, back pain and joint swelling.   Neurological: Negative for dizziness, syncope, light-headedness and headaches.   Hematological: Does not bruise/bleed easily.   Psychiatric/Behavioral: Negative for dysphoric mood and sleep disturbance. The patient is not nervous/anxious.        Vitals:    11/23/20 1330   BP: 140/86   BP Location: Left upper arm   Patient Position: Sitting   Pulse: 64   Resp: 14   Temp: 37 °C (98.6 °F)   SpO2: 98%   Weight: 95.7 kg (211 lb)   Height: 1.791 m (5' 10.5\")       Body mass index is " 29.85 kg/m².      Physical Exam  Vitals signs reviewed.   Constitutional:       General: He is not in acute distress.     Appearance: He is well-developed. He is not diaphoretic.   HENT:      Head: Normocephalic and atraumatic.      Right Ear: External ear normal.      Left Ear: External ear normal.      Nose: Nose normal.   Eyes:      General: No scleral icterus.        Right eye: No discharge.         Left eye: No discharge.      Conjunctiva/sclera: Conjunctivae normal.      Pupils: Pupils are equal, round, and reactive to light.   Neck:      Musculoskeletal: Normal range of motion and neck supple. No muscular tenderness.      Thyroid: No thyromegaly.      Vascular: No carotid bruit or JVD.      Trachea: No tracheal deviation.   Cardiovascular:      Rate and Rhythm: Normal rate and regular rhythm.      Pulses: Normal pulses.      Heart sounds: Normal heart sounds. No murmur. No friction rub. No gallop.    Pulmonary:      Effort: Pulmonary effort is normal. No respiratory distress.      Breath sounds: Normal breath sounds. No wheezing or rales.   Chest:      Chest wall: No tenderness.   Abdominal:      General: Bowel sounds are normal. There is no distension.      Palpations: Abdomen is soft. There is no mass.      Tenderness: There is no abdominal tenderness. There is no guarding or rebound.      Hernia: No hernia is present.   Musculoskeletal: Normal range of motion.         General: No tenderness or deformity.   Lymphadenopathy:      Cervical: No cervical adenopathy.   Skin:     General: Skin is warm and dry.      Coloration: Skin is not pale.      Findings: No erythema or rash.   Neurological:      Mental Status: He is alert and oriented to person, place, and time.      Cranial Nerves: No cranial nerve deficit.      Sensory: No sensory deficit.      Motor: No weakness or abnormal muscle tone.   Psychiatric:         Behavior: Behavior normal.         Thought Content: Thought content normal.         Judgment:  Judgment normal.            Assessment/Plan     Problem List Items Addressed This Visit        Genitourinary    Prostate cancer (CMS/HCC)     Scheduled for OR with Dr Acevedo, radical prostatectomy.             Endocrine/Metabolic    Hypercholesterolemia - Primary     On statin since 3/2020.  No follow up labs yet.  Tolerating medication well with no side effects.               Other    Elevated BP without diagnosis of hypertension     BP high today. Usually well controlled.  Had been flustered to get paperwork.  Discussed avoid excess salt in diet.   Would monitor.          Pre-op examination     Seen today for pre-op evalaution, scheduled for radical prostatectomy per Dr Acevedo, at [facility]. Clinically doing well. No cardiac or repiratory symptoms. No new med changes; reviewed meds, SH, FH, PMH. No bleeding diathesis; no cardiac or resp symptoms.     Laboratory studies reviewed. CBC, Chem 7 and coagulation studies within normal limits.     EKG. regular rate, rhythm. RBBB present since at last 2014. Old EKG attached.     Patient cleared for planned procedure.                        There are no Patient Instructions on file for this visit.    No follow-ups on file.      Britni Yee MD  11/23/2020

## 2020-11-23 NOTE — ASSESSMENT & PLAN NOTE
Seen today for pre-op evalaution, scheduled for radical prostatectomy per Dr Acevedo, at [facility]. Clinically doing well. No cardiac or repiratory symptoms. No new med changes; reviewed meds, SH, FH, PMH. No bleeding diathesis; no cardiac or resp symptoms.     Laboratory studies reviewed. CBC, Chem 7 and coagulation studies within normal limits.     EKG. regular rate, rhythm. RBBB present since at last 2014. Old EKG attached.     Patient cleared for planned procedure.

## 2020-11-23 NOTE — ASSESSMENT & PLAN NOTE
BP high today. Usually well controlled.  Had been flustered to get paperwork.  Discussed avoid excess salt in diet.   Would monitor.

## 2020-11-23 NOTE — ASSESSMENT & PLAN NOTE
On statin since 3/2020.  No follow up labs yet.  Tolerating medication well with no side effects.

## 2021-10-14 ENCOUNTER — OFFICE VISIT (OUTPATIENT)
Dept: PRIMARY CARE | Facility: CLINIC | Age: 56
End: 2021-10-14
Payer: COMMERCIAL

## 2021-10-14 ENCOUNTER — TELEPHONE (OUTPATIENT)
Dept: PRIMARY CARE | Facility: CLINIC | Age: 56
End: 2021-10-14

## 2021-10-14 VITALS
HEIGHT: 71 IN | HEART RATE: 83 BPM | OXYGEN SATURATION: 96 % | SYSTOLIC BLOOD PRESSURE: 134 MMHG | BODY MASS INDEX: 28.56 KG/M2 | DIASTOLIC BLOOD PRESSURE: 76 MMHG | WEIGHT: 204 LBS | TEMPERATURE: 98.6 F

## 2021-10-14 DIAGNOSIS — Z13.220 SCREENING FOR HYPERLIPIDEMIA: ICD-10-CM

## 2021-10-14 DIAGNOSIS — E78.00 HYPERCHOLESTEROLEMIA: ICD-10-CM

## 2021-10-14 DIAGNOSIS — C61 PROSTATE CANCER (CMS/HCC): ICD-10-CM

## 2021-10-14 DIAGNOSIS — Z13.1 SCREENING FOR DIABETES MELLITUS: ICD-10-CM

## 2021-10-14 DIAGNOSIS — Z00.00 ENCOUNTER FOR GENERAL ADULT MEDICAL EXAMINATION WITHOUT ABNORMAL FINDINGS: Primary | ICD-10-CM

## 2021-10-14 DIAGNOSIS — Z11.59 ENCOUNTER FOR HEPATITIS C SCREENING TEST FOR LOW RISK PATIENT: ICD-10-CM

## 2021-10-14 DIAGNOSIS — Z23 FLU VACCINE NEED: ICD-10-CM

## 2021-10-14 PROBLEM — R03.0 ELEVATED BP WITHOUT DIAGNOSIS OF HYPERTENSION: Status: RESOLVED | Noted: 2020-11-23 | Resolved: 2021-10-14

## 2021-10-14 PROBLEM — Z01.818 PRE-OP EXAMINATION: Status: RESOLVED | Noted: 2020-11-23 | Resolved: 2021-10-14

## 2021-10-14 PROCEDURE — 99396 PREV VISIT EST AGE 40-64: CPT | Mod: 25 | Performed by: STUDENT IN AN ORGANIZED HEALTH CARE EDUCATION/TRAINING PROGRAM

## 2021-10-14 PROCEDURE — 90686 IIV4 VACC NO PRSV 0.5 ML IM: CPT | Performed by: STUDENT IN AN ORGANIZED HEALTH CARE EDUCATION/TRAINING PROGRAM

## 2021-10-14 PROCEDURE — 90471 IMMUNIZATION ADMIN: CPT | Performed by: STUDENT IN AN ORGANIZED HEALTH CARE EDUCATION/TRAINING PROGRAM

## 2021-10-14 PROCEDURE — 3008F BODY MASS INDEX DOCD: CPT | Performed by: STUDENT IN AN ORGANIZED HEALTH CARE EDUCATION/TRAINING PROGRAM

## 2021-10-14 RX ORDER — ROSUVASTATIN CALCIUM 10 MG/1
10 TABLET, COATED ORAL
Qty: 30 TABLET | Refills: 5 | Status: SHIPPED | OUTPATIENT
Start: 2021-10-14 | End: 2024-04-04

## 2021-10-14 ASSESSMENT — ENCOUNTER SYMPTOMS
SHORTNESS OF BREATH: 0
NAUSEA: 0
CONSTIPATION: 0
COUGH: 0
FATIGUE: 0
ABDOMINAL PAIN: 0
SORE THROAT: 0
DIZZINESS: 0
FEVER: 0
JOINT SWELLING: 0
DIARRHEA: 0
EYE PAIN: 0
VOMITING: 0
APPETITE CHANGE: 0
LIGHT-HEADEDNESS: 0
WOUND: 1

## 2021-10-14 NOTE — ASSESSMENT & PLAN NOTE
Stopped cholesterol med recently, two months ago  Refilled today  Will get labs done in December (When he is due for next psa) will see what his levels are like at that time as he wouldve been on meds again for a few months.   ascvd risk is elevated so prefer pt to be on it.

## 2021-10-14 NOTE — PATIENT INSTRUCTIONS
Please call your insurance company to see if Shingrix (shingles) vaccine, is covered by your insurance. If it is, please call us to schedule a nurse visit to get this done.

## 2021-10-14 NOTE — ASSESSMENT & PLAN NOTE
Discussed diet and physical activity practices to promote healthy lifestyle.  Discussed mental health wellness.  Discussed dental, eye health.  High risk behaviors:None  Immunizations: flu today. he will think aout shingrix  Cancer screening: rec'd calling GI doc for colon cancer screening  Labs: ordered as below

## 2021-10-14 NOTE — ASSESSMENT & PLAN NOTE
Doing well overall  Followed closely by urology  Recent PSA wnl which is great to see  Suspect wound is just from constant friction resulting incomplete ability to heal. rec'd coverin with bndage for a week, possible shaving hair around area for a short period of time to prevent hairs from stick/pulling. Also rec'd showers immediately after exercise for hygiene purposes with this wound.   Monitor otherwise, not infected appearing.

## 2021-10-14 NOTE — PROGRESS NOTES
Subjective      Patient ID: Des Moran is a 56 y.o. male.  1965    HPI    F/u prostate cancer  Celena score 9  And a radical prostatectomy on 12/21/21 by Dr. Acevedo   Some incontinence post procedure but improving  Difficulty with erections, previously on cialis  PSA undetectable     Diet -  Tries to balance with protein, cars, vegetables for dinners; high protein diet; lunch is variable     Exercise - weight training, twice weekly or more; walking a lot     Mood - doing well    Living Situation - lives with wife, has kids in [facility]    Dentist - due     Eye Check -  n/a    Smoking - Yes, occasional cigar   EtOH - Yes  Illicit Drug Use - No  Sexual Activity - Yes  STD screen - discussed, declined    Colonoscopy (45-50 -?insurance coverage, 50-75): due   DM screen: due   HLD screen: due   Tdap: UTD  Flu vaccine: due   Shingrix (50+, 60+ given insurance): due   Hep C screen (18-80yo): due   Covid vaccination (13yo+): UTD    Review of Systems   Constitutional: Negative for appetite change, fatigue and fever.   HENT: Negative for congestion and sore throat.    Eyes: Negative for pain and visual disturbance.   Respiratory: Negative for cough and shortness of breath.    Cardiovascular: Negative for chest pain and leg swelling.   Gastrointestinal: Negative for abdominal pain, constipation, diarrhea, nausea and vomiting.   Musculoskeletal: Negative for joint swelling.   Skin: Positive for wound (on abdomen where trocar was inserted during srugery; weeps every so often, doesnt seem to be healing). Negative for rash.   Neurological: Negative for dizziness and light-headedness.       The following have been reviewed and updated as appropriate in this visit:    Patient Active Problem List   Diagnosis   • Seasonal allergies   • Hypercholesterolemia   • Other male erectile dysfunction   • Cold sore   • Prostate cancer (CMS/HCC)   • Encounter for general adult medical examination without abnormal findings         Current  "Outpatient Medications:   •  melatonin 5 mg tablet, Take 5 mg by mouth nightly., Disp: , Rfl:   •  tadalafil (CIALIS) 5 mg tablet, Take 1 tablet (5 mg total) by mouth daily., Disp: 30 tablet, Rfl: 11  •  valACYclovir (VALTREX) 1 gram tablet, Take 2 tablets (2,000 mg total) by mouth 2 (two) times a day., Disp: 16 tablet, Rfl: 2  •  cholecalciferol, vitamin D3, 5,000 unit tablet, Take 1 tablet by mouth daily., Disp: , Rfl:   •  multivitamin capsule, No SIG Entered, Disp: , Rfl:   •  omega 3-dha-epa-fish oil (FISH OIL) 300-1,000 mg capsule,delayed release(DR/EC), Take 1 capsule by mouth daily., Disp: , Rfl:   •  rosuvastatin 10 mg tablet, Take 1 tablet (10 mg total) by mouth once daily., Disp: 30 tablet, Rfl: 5    Allergies   Allergen Reactions   • No Known Allergies        Family History   Problem Relation Age of Onset   • Breast cancer Biological Mother    • Hypertension Biological Mother    • Prostate cancer Biological Father    • Alcohol abuse Biological Father    • Bipolar disorder Biological Sister    • Alcohol abuse Biological Sister    • Cirrhosis Paternal Grandmother    • Heart disease Mother's Brother        Past Surgical History:   Procedure Laterality Date   • PROSTATECTOMY  12/21/2020       Objective     Vitals:    10/14/21 1402   BP: 134/76   BP Location: Left upper arm   Patient Position: Sitting   Pulse: 83   Temp: 37 °C (98.6 °F)   SpO2: 96%   Weight: 92.5 kg (204 lb)   Height: 1.791 m (5' 10.5\")     Body mass index is 28.86 kg/m².    Physical Exam  Vitals reviewed.   Constitutional:       General: He is not in acute distress.     Appearance: Normal appearance. He is well-developed. He is not ill-appearing, toxic-appearing or diaphoretic.   HENT:      Head: Normocephalic and atraumatic.      Right Ear: Tympanic membrane, ear canal and external ear normal. There is no impacted cerumen.      Left Ear: Tympanic membrane, ear canal and external ear normal. There is no impacted cerumen.   Eyes:      " General: No scleral icterus.        Right eye: No discharge.         Left eye: No discharge.      Extraocular Movements: Extraocular movements intact.      Conjunctiva/sclera: Conjunctivae normal.   Neck:      Thyroid: No thyromegaly.   Cardiovascular:      Rate and Rhythm: Normal rate and regular rhythm.      Heart sounds: Normal heart sounds. No murmur heard.  No friction rub. No gallop.    Pulmonary:      Effort: Pulmonary effort is normal. No respiratory distress.      Breath sounds: Normal breath sounds. No stridor. No wheezing, rhonchi or rales.   Chest:      Chest wall: No tenderness.   Abdominal:      General: Bowel sounds are normal. There is no distension.      Palpations: Abdomen is soft. There is no mass.      Tenderness: There is no abdominal tenderness. There is no guarding or rebound.      Hernia: No hernia is present.   Musculoskeletal:         General: Normal range of motion.      Cervical back: Normal range of motion and neck supple.      Right lower leg: No edema.      Left lower leg: No edema.   Skin:     General: Skin is warm.      Capillary Refill: Capillary refill takes less than 2 seconds.      Findings: Lesion (superficial abrasian roughy 0.5cm in diameter with small punctate area of skin breakdown (hair stucks to surface, when removed, caused clear weeping)) present.   Neurological:      General: No focal deficit present.      Mental Status: He is alert and oriented to person, place, and time. Mental status is at baseline.   Psychiatric:         Mood and Affect: Mood normal.         Behavior: Behavior normal.         Thought Content: Thought content normal.         Judgment: Judgment normal.         Assessment/Plan   Diagnoses and all orders for this visit:    Encounter for general adult medical examination without abnormal findings (Primary)  Assessment & Plan:  Discussed diet and physical activity practices to promote healthy lifestyle.  Discussed mental health wellness.  Discussed  dental, eye health.  High risk behaviors:None  Immunizations: flu today. he will think aout shingrix  Cancer screening: rec'd calling GI doc for colon cancer screening  Labs: ordered as below           Hypercholesterolemia  Assessment & Plan:  Stopped cholesterol med recently, two months ago  Refilled today  Will get labs done in December (When he is due for next psa) will see what his levels are like at that time as he wouldve been on meds again for a few months.   ascvd risk is elevated so prefer pt to be on it.      Screening for hyperlipidemia  -     Lipid panel; Future    Screening for diabetes mellitus  -     Hemoglobin A1c; Future  -     Comprehensive metabolic panel; Future    Encounter for hepatitis C screening test for low risk patient  -     Hepatitis C antibody; Future    Flu vaccine need  -     Influenza vaccine quadrivalent preservative free 6 mon and older IM (FluLaval)    Prostate cancer (CMS/HCC)  Assessment & Plan:  Doing well overall  Followed closely by urology  Recent PSA wnl which is great to see  Suspect wound is just from constant friction resulting incomplete ability to heal. rec'd coverin with bndage for a week, possible shaving hair around area for a short period of time to prevent hairs from stick/pulling. Also rec'd showers immediately after exercise for hygiene purposes with this wound.   Monitor otherwise, not infected appearing.       Other orders  -     rosuvastatin 10 mg tablet; Take 1 tablet (10 mg total) by mouth once daily.           Kassandra Love M.D.

## 2021-11-04 ENCOUNTER — PATIENT OUTREACH (OUTPATIENT)
Dept: CASE MANAGEMENT | Facility: CLINIC | Age: 56
End: 2021-11-04

## 2021-11-04 ASSESSMENT — ENCOUNTER SYMPTOMS
LIGHT-HEADEDNESS: 0
NAUSEA: 0
CARDIOVASCULAR NEGATIVE: 1
FEVER: 0
RESPIRATORY NEGATIVE: 1
DIZZINESS: 0
MUSCULOSKELETAL NEGATIVE: 1
VOMITING: 0

## 2021-11-04 NOTE — PROGRESS NOTES
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 He described recent onset of mild abdominal symptoms first starting on 10/23/21. He had been in contact with his oncologist who ordered an abdominal CT scan, completed on 10/28/21. Due to findings and worsening symptoms of abdominal pain, nausea and intermittent constipation he was admitted for further workup. CT showed a small bowel blockage at appendix. 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 He was on clear liquids, now advanced to fulls and reports he is moving his bowels, pain and nausea have resolved. He will go for a port placement tomorrow 11/5/21 and begin outpatient chemotherapy late next week for 12 weeks. A repeat CT will be done at that time to discuss next steps.    He was in good spirits and has a positive outlook.  One year ago he was diagnosed with prostate cancer. We reviewed his medications and he reports the only things he is taking are the tadalafil and melatonin 6mg. All other medications stopped.    He cut the call short as he was working and had to take a call. He will follow with PCP as needed, at this time he will follow up with Oncology at [facility].    Review of Systems   Constitutional: Negative for fever.   Respiratory: Negative.    Cardiovascular: Negative.    Gastrointestinal:  Negative for nausea and vomiting.   Genitourinary: Negative.    Musculoskeletal: Negative.    Neurological: Negative for dizziness, syncope and light-headedness.       Medication Review:    Medication Review: Yes     Reported by: Patient  Any new medications prescribed at discharge?: No  Is the patient having any side effects they believe may be caused by any medication additions or changes?: No     Do you have enough of your regularly prescribed medications?: Yes    Medication adherence problem?: No  Was a medication discrepancy indentified?: No    Nursing Interventions: No intervention needed  Reconciled the current and discharge medications: Yes  Reviewed AVS (Discharge Instructions)?: Yes      Acute Pain:    Chronic Pain:    Diet/Nutrition:    Type of Diet:  (full liquids)    Home Care Services:  Home Care Interventions: No intervention required     Post-Discharge Durable Medical Equipment::    Durable Medical Equipment: None  Oxygen Use: No  DME Interventions: No intervention required    Home Management:    Living Arrangement: Spouse     Home Monitoring: None  Any patient reported falls in the last 3 months?: No  Yarsani or spiritual beliefs that impact treatment?: No    Appointment Scheduling:  Patient Scheduling Dispositions: Pt declined appt     Follow-Ups:    Relevant Specialist Follow-ups: Heme Oncology    Interventions/ Care Coordination:    Interventions/ Care Coordination: Encouraged patient to call PCP/Specialist  General Education:  (follow up and meds)    Reviewed signs/symptoms of worsening condition or complication that necessitate a call to the Physician's office.  Educated patient on access to care.  RN phone number given for future care management.    Rhoda Neff RN  [phone]

## 2022-02-03 PROBLEM — C18.1 APPENDIX CARCINOMA (CMS/HCC): Status: ACTIVE | Noted: 2021-11-02

## 2022-02-03 PROBLEM — I82.A12 ACUTE DEEP VEIN THROMBOSIS (DVT) OF AXILLARY VEIN OF LEFT UPPER EXTREMITY (CMS/HCC): Status: ACTIVE | Noted: 2021-11-29

## 2022-05-26 ENCOUNTER — APPOINTMENT (OUTPATIENT)
Dept: URBAN - METROPOLITAN AREA CLINIC 200 | Age: 57
Setting detail: DERMATOLOGY
End: 2022-05-27

## 2022-05-26 DIAGNOSIS — Z11.52 ENCOUNTER FOR SCREENING FOR COVID-19: ICD-10-CM

## 2022-05-26 DIAGNOSIS — L57.8 OTHER SKIN CHANGES DUE TO CHRONIC EXPOSURE TO NONIONIZING RADIATION: ICD-10-CM

## 2022-05-26 DIAGNOSIS — L20.84 INTRINSIC (ALLERGIC) ECZEMA: ICD-10-CM

## 2022-05-26 DIAGNOSIS — L57.0 ACTINIC KERATOSIS: ICD-10-CM

## 2022-05-26 DIAGNOSIS — L82.1 OTHER SEBORRHEIC KERATOSIS: ICD-10-CM

## 2022-05-26 PROBLEM — J30.1 ALLERGIC RHINITIS DUE TO POLLEN: Status: ACTIVE | Noted: 2022-05-26

## 2022-05-26 PROCEDURE — OTHER COUNSELING: OTHER

## 2022-05-26 PROCEDURE — 17000 DESTRUCT PREMALG LESION: CPT

## 2022-05-26 PROCEDURE — OTHER SUNSCREEN RECOMMENDATIONS: OTHER

## 2022-05-26 PROCEDURE — OTHER REASSURANCE: OTHER

## 2022-05-26 PROCEDURE — OTHER PRESCRIPTION MEDICATION MANAGEMENT: OTHER

## 2022-05-26 PROCEDURE — 99213 OFFICE O/P EST LOW 20 MIN: CPT | Mod: 25

## 2022-05-26 PROCEDURE — OTHER SCREENING FOR COVID-19: OTHER

## 2022-05-26 PROCEDURE — 17003 DESTRUCT PREMALG LES 2-14: CPT

## 2022-05-26 PROCEDURE — OTHER LIQUID NITROGEN: OTHER

## 2022-05-26 ASSESSMENT — SEVERITY ASSESSMENT 2020: SEVERITY 2020: ALMOST CLEAR

## 2022-05-26 ASSESSMENT — LOCATION DETAILED DESCRIPTION DERM
LOCATION DETAILED: LEFT MID-UPPER BACK
LOCATION DETAILED: LEFT SUPERIOR PARIETAL SCALP
LOCATION DETAILED: LEFT CENTRAL FRONTAL SCALP
LOCATION DETAILED: LEFT INFERIOR LATERAL UPPER BACK
LOCATION DETAILED: PERIUMBILICAL SKIN

## 2022-05-26 ASSESSMENT — LOCATION SIMPLE DESCRIPTION DERM
LOCATION SIMPLE: LEFT UPPER BACK
LOCATION SIMPLE: SCALP
LOCATION SIMPLE: LEFT SCALP
LOCATION SIMPLE: ABDOMEN

## 2022-05-26 ASSESSMENT — LOCATION ZONE DERM
LOCATION ZONE: TRUNK
LOCATION ZONE: SCALP

## 2022-05-26 ASSESSMENT — PAIN INTENSITY VAS: HOW INTENSE IS YOUR PAIN 0 BEING NO PAIN, 10 BEING THE MOST SEVERE PAIN POSSIBLE?: NO PAIN

## 2022-05-26 NOTE — PROCEDURE: LIQUID NITROGEN
Show Aperture Variable?: Yes
Post-Care Instructions: I reviewed with the patient in detail post-care instructions. Patient is to wear sunprotection, and avoid picking at any of the treated lesions. Pt may apply Vaseline to crusted or scabbing areas.
Render Note In Bullet Format When Appropriate: No
Number Of Freeze-Thaw Cycles: 1 freeze-thaw cycle
Duration Of Freeze Thaw-Cycle (Seconds): 1
Detail Level: Detailed
Consent: The patient's consent was obtained including but not limited to risks of crusting, scabbing, blistering, scarring, darker or lighter pigmentary change, recurrence, incomplete removal and infection.

## 2022-12-26 ENCOUNTER — TELEPHONE (OUTPATIENT)
Dept: PRIMARY CARE | Facility: CLINIC | Age: 57
End: 2022-12-26
Payer: COMMERCIAL

## 2022-12-26 NOTE — TELEPHONE ENCOUNTER
Spoke with patient.    Seda nicolee, developed rash on mid-chest. Yesterday, spread all over torso. A little itch. A little on legs and arms. Patient concerned for shingles.    No new exposures. No fever.  No new medications.    Did just have Laporoscopic suregery on 12/13.    Start Antihistamine (zyrtec/allegra in morning, benadryl HS). Will call in morning to make appointment.

## 2022-12-27 ENCOUNTER — OFFICE VISIT (OUTPATIENT)
Dept: PRIMARY CARE | Facility: CLINIC | Age: 57
End: 2022-12-27
Payer: COMMERCIAL

## 2022-12-27 VITALS
BODY MASS INDEX: 29.23 KG/M2 | RESPIRATION RATE: 17 BRPM | DIASTOLIC BLOOD PRESSURE: 82 MMHG | HEART RATE: 75 BPM | WEIGHT: 206.6 LBS | SYSTOLIC BLOOD PRESSURE: 130 MMHG | OXYGEN SATURATION: 96 % | TEMPERATURE: 97.2 F

## 2022-12-27 DIAGNOSIS — L50.9 HIVES: Primary | ICD-10-CM

## 2022-12-27 PROCEDURE — 3008F BODY MASS INDEX DOCD: CPT | Performed by: STUDENT IN AN ORGANIZED HEALTH CARE EDUCATION/TRAINING PROGRAM

## 2022-12-27 PROCEDURE — 99214 OFFICE O/P EST MOD 30 MIN: CPT | Performed by: STUDENT IN AN ORGANIZED HEALTH CARE EDUCATION/TRAINING PROGRAM

## 2022-12-27 RX ORDER — METHYLPREDNISOLONE 4 MG/1
TABLET ORAL
Qty: 21 TABLET | Refills: 0 | Status: SHIPPED | OUTPATIENT
Start: 2022-12-27 | End: 2023-01-03

## 2022-12-27 NOTE — PROGRESS NOTES
[facility] IN [place]  Patient Name:  Des Moran (MRN [id number])    YOB: 1965 (57 y.o.)    Sex:  male    Gender:  male       12/27/2022     SUBJECTIVE:      CC:  Rash             HPI:  Patient presents today for the following:      Rash  -since [place], noticed rash on chest. Spread to arms and legs  -not as itchy now. Denies burning or pain  -took aleve for hives yesterday  -no fever, no chills  -did use Maldonado to get hair off chest on 12/10, no immediate reaction  -has history of skin reaction with port  -on 12/13 had laparoscopy  -no new detergents or soaps  -denies recent illness but did feel off  -appendiceal carcinoma, restarting chemo. History of prostate cancer with prostatectomy    ROS:  See above and HPI for pertinent positives and negatives.          Patient Active Problem List   Diagnosis   • Seasonal allergies   • Hypercholesterolemia   • Other male erectile dysfunction   • Cold sore   • Prostate cancer (CMS/HCC)   • Encounter for general adult medical examination without abnormal findings   • Appendix carcinoma (CMS/HCC)   • Acute deep vein thrombosis (DVT) of axillary vein of left upper extremity (CMS/HCC)       Past Medical History:   Diagnosis Date   • Prostate cancer (CMS/HCC)        Current Outpatient Medications   Medication Sig Dispense Refill   • methylPREDNISolone (MEDROL DOSEPACK) 4 mg tablet Follow package directions. 21 tablet 0   • cholecalciferol, vitamin D3, 5,000 unit tablet Take 1 tablet by mouth daily.     • melatonin 5 mg tablet Take 5 mg by mouth nightly.     • multivitamin capsule No SIG Entered     • omega 3-dha-epa-fish oil (FISH OIL) 300-1,000 mg capsule,delayed release(DR/EC) Take 1 capsule by mouth daily.     • rosuvastatin 10 mg tablet Take 1 tablet (10 mg total) by mouth once daily. 30 tablet 5   • tadalafil (CIALIS) 5 mg tablet Take 1 tablet (5 mg total) by mouth daily. 30 tablet 11   • valACYclovir (VALTREX) 1 gram tablet Take  2 tablets (2,000 mg total) by mouth 2 (two) times a day. 16 tablet 2     No current facility-administered medications for this visit.       Allergies   Allergen Reactions   • No Known Allergies      Family History   Problem Relation Age of Onset   • Breast cancer Biological Mother    • Hypertension Biological Mother    • Prostate cancer Biological Father    • Alcohol abuse Biological Father    • Bipolar disorder Biological Sister    • Alcohol abuse Biological Sister    • Cirrhosis Paternal Grandmother    • Heart disease Mother's Brother      Social History     Socioeconomic History   • Marital status:      Spouse name: None   • Number of children: None   • Years of education: None   • Highest education level: None   Occupational History   • Occupation:      Comment: self employed   Tobacco Use   • Smoking status: Some Days     Types: Cigars   • Smokeless tobacco: Never   • Tobacco comments:     RARE cigar   Substance and Sexual Activity   • Alcohol use: Yes     Alcohol/week: 10.0 standard drinks     Types: 10 Cans of beer per week   • Drug use: No   • Sexual activity: Yes     Partners: Female      Past Surgical History:   Procedure Laterality Date   • PROSTATECTOMY  2020       OBJECTIVE:      Vitals:    22 0936   BP: 130/82   BP Location: Right upper arm   Patient Position: Sitting   Pulse: 75   Resp: 17   Temp: 36.2 °C (97.2 °F)   TempSrc: Temporal   SpO2: 96%   Weight: 93.7 kg (206 lb 9.6 oz)       Body mass index is 29.23 kg/m².  Wt Readings from Last 3 Encounters:   22 93.7 kg (206 lb 9.6 oz)   10/14/21 92.5 kg (204 lb)   20 95.7 kg (211 lb)       General: No apparent distress, well nourished    Skin: maculopapular blanching rash most diffuse on chest, back, b/l arms L>R, milder on legs      ASSESSMENT:      Des Moran is a 57 y.o. male here with the followin. Hives          PLAN:      Diagnoses and all orders for this visit:    Hives (Primary)  -      methylPREDNISolone (MEDROL DOSEPACK) 4 mg tablet; Follow package directions.    Viral exanthem vs chemical dermatitis reaction although timing with Maldonado makes this less likely. Recommend antihistamine daily such as Allegra, claritin or Zyrtec, may take benadryl q6 hours - caution drowsiness. Start medrol dose pack.       Whitney Phillips, DO

## 2023-02-02 ENCOUNTER — OFFICE VISIT (OUTPATIENT)
Dept: PRIMARY CARE | Facility: CLINIC | Age: 58
End: 2023-02-02
Payer: COMMERCIAL

## 2023-02-02 VITALS
SYSTOLIC BLOOD PRESSURE: 126 MMHG | BODY MASS INDEX: 28.72 KG/M2 | TEMPERATURE: 97 F | HEART RATE: 80 BPM | WEIGHT: 203 LBS | DIASTOLIC BLOOD PRESSURE: 76 MMHG

## 2023-02-02 DIAGNOSIS — J01.01 ACUTE RECURRENT MAXILLARY SINUSITIS: ICD-10-CM

## 2023-02-02 DIAGNOSIS — A46 ERYSIPELAS: Primary | ICD-10-CM

## 2023-02-02 PROCEDURE — 3008F BODY MASS INDEX DOCD: CPT | Performed by: STUDENT IN AN ORGANIZED HEALTH CARE EDUCATION/TRAINING PROGRAM

## 2023-02-02 PROCEDURE — 99214 OFFICE O/P EST MOD 30 MIN: CPT | Performed by: STUDENT IN AN ORGANIZED HEALTH CARE EDUCATION/TRAINING PROGRAM

## 2023-02-02 RX ORDER — PREDNISONE 20 MG/1
TABLET ORAL
Qty: 13 TABLET | Refills: 0 | Status: SHIPPED | OUTPATIENT
Start: 2023-02-02 | End: 2023-04-20

## 2023-02-02 NOTE — PROGRESS NOTES
[facility] IN [place]  Patient Name:  Des Moran (MRN [id number])    YOB: 1965 (57 y.o.)    Sex:  male    Gender:  male       2/2/2023     SUBJECTIVE:      CC:  Nasal Congestion             HPI:  Patient presents today for the following:      Rash   -hx sinus infections, usually gets Zpak  -chemo last week for appendiceal carcinoma, started getting congestion. Had surgery in April  -woke up Monday, nose red, blowing nose a lot, chills. Epistaxis with blowing nose is usual for him  -negative Covid  -yesterday morning redness spread to cheeks, nose swollen but less so now  -went to , was given Augmentin, had 3 doses since. Takes a twice a day, 7 day course given  -tenderness around mouth that has not gotten better  -L side more swollen  -takes lisinopril and xarelto, melatonin, medical marijuana for nausea with chemo  -no headaches, no fevers  -rash is not itchy         ROS:  See above and HPI for pertinent positives and negatives.          Patient Active Problem List   Diagnosis   • Seasonal allergies   • Hypercholesterolemia   • Other male erectile dysfunction   • Cold sore   • Prostate cancer (CMS/HCC)   • Encounter for general adult medical examination without abnormal findings   • Appendix carcinoma (CMS/HCC)   • Acute deep vein thrombosis (DVT) of axillary vein of left upper extremity (CMS/HCC)       Past Medical History:   Diagnosis Date   • Prostate cancer (CMS/HCC)        Current Outpatient Medications   Medication Sig Dispense Refill   • cholecalciferol, vitamin D3, 5,000 unit tablet Take 1 tablet by mouth daily.     • melatonin 5 mg tablet Take 5 mg by mouth nightly.     • multivitamin capsule No SIG Entered     • omega 3-dha-epa-fish oil 300-1,000 mg capsule,delayed release(DR/EC) Take 1 capsule by mouth daily.     • predniSONE (DELTASONE) 20 mg tablet Take 3 tabs for 2 days, 2 tabs for 2 days, 1 tab for 2 days, half a tab for 2 days 13 tablet 0   • rosuvastatin  10 mg tablet Take 1 tablet (10 mg total) by mouth once daily. 30 tablet 5   • valACYclovir (VALTREX) 1 gram tablet Take 2 tablets (2,000 mg total) by mouth 2 (two) times a day. 16 tablet 2   • tadalafil (CIALIS) 5 mg tablet Take 1 tablet (5 mg total) by mouth daily. 30 tablet 11     No current facility-administered medications for this visit.       Allergies   Allergen Reactions   • No Known Allergies      Family History   Problem Relation Age of Onset   • Breast cancer Biological Mother    • Hypertension Biological Mother    • Prostate cancer Biological Father    • Alcohol abuse Biological Father    • Bipolar disorder Biological Sister    • Alcohol abuse Biological Sister    • Cirrhosis Paternal Grandmother    • Heart disease Mother's Brother      Social History     Socioeconomic History   • Marital status:      Spouse name: None   • Number of children: None   • Years of education: None   • Highest education level: None   Occupational History   • Occupation:      Comment: self employed   Tobacco Use   • Smoking status: Some Days     Types: Cigars   • Smokeless tobacco: Never   • Tobacco comments:     RARE cigar   Substance and Sexual Activity   • Alcohol use: Yes     Alcohol/week: 10.0 standard drinks     Types: 10 Cans of beer per week   • Drug use: No   • Sexual activity: Yes     Partners: Female      Past Surgical History:   Procedure Laterality Date   • PROSTATECTOMY  12/21/2020       OBJECTIVE:      Vitals:    02/02/23 0906   BP: 126/76   BP Location: Left upper arm   Patient Position: Sitting   Pulse: 80   Temp: 36.1 °C (97 °F)   Weight: 92.1 kg (203 lb)       Body mass index is 28.72 kg/m².  Wt Readings from Last 3 Encounters:   02/02/23 92.1 kg (203 lb)   12/27/22 93.7 kg (206 lb 9.6 oz)   10/14/21 92.5 kg (204 lb)       General: No apparent distress, well nourished    Eye Exam: PERRL, EOMI, Conjunctiva are pink and non-injected, sclera clear    Ears: External ears normal, Canals  clear, TM's bilaterally without effusion, erythema, bulging    Nose: swollen with erythema, +crusted blood at nares, no mucosal erythema, no mucosal edema, no purulent discharge  Oropharynx: no exudate, no erythema, lips, buccal mucosa, and tongue normal, mucous membranes are moist.  Neck: supple,+cervical adenopathy  Skin: raised confluent erythema with swelling in malar distribution b/l upper cheeks involving entire nose.       ASSESSMENT:      Des Moran is a 57 y.o. male here with the followin. Erysipelas    2. Acute recurrent maxillary sinusitis          PLAN:        Facial erysipelas secondary to bacterial sinusitis. Continue Augmentin to cover strep. Will also order steroid to help with swelling, facial discomfort. F/u in 1 week to ensure resolution.  Monitor for worsening, fevers, involvement of eyes and call.  Diagnoses and all orders for this visit:    Erysipelas (Primary)    Acute recurrent maxillary sinusitis  -     predniSONE (DELTASONE) 20 mg tablet; Take 3 tabs for 2 days, 2 tabs for 2 days, 1 tab for 2 days, half a tab for 2 days          Whitney Phillips DO

## 2023-04-20 ENCOUNTER — CONSULT (OUTPATIENT)
Dept: PRIMARY CARE | Facility: CLINIC | Age: 58
End: 2023-04-20
Payer: COMMERCIAL

## 2023-04-20 VITALS
HEART RATE: 77 BPM | WEIGHT: 200.4 LBS | OXYGEN SATURATION: 97 % | HEIGHT: 70 IN | DIASTOLIC BLOOD PRESSURE: 78 MMHG | TEMPERATURE: 98.2 F | SYSTOLIC BLOOD PRESSURE: 110 MMHG | BODY MASS INDEX: 28.69 KG/M2 | RESPIRATION RATE: 16 BRPM

## 2023-04-20 DIAGNOSIS — I10 PRIMARY HYPERTENSION: ICD-10-CM

## 2023-04-20 DIAGNOSIS — Z85.46 HISTORY OF PROSTATE CANCER: ICD-10-CM

## 2023-04-20 DIAGNOSIS — C18.1 APPENDIX CARCINOMA (CMS/HCC): ICD-10-CM

## 2023-04-20 DIAGNOSIS — E78.00 HYPERCHOLESTEROLEMIA: ICD-10-CM

## 2023-04-20 DIAGNOSIS — Z86.711 HISTORY OF PULMONARY EMBOLISM: ICD-10-CM

## 2023-04-20 DIAGNOSIS — Z86.718 HISTORY OF DVT (DEEP VEIN THROMBOSIS): ICD-10-CM

## 2023-04-20 DIAGNOSIS — Z01.818 PREOP EXAMINATION: Primary | ICD-10-CM

## 2023-04-20 PROBLEM — I82.A12 ACUTE DEEP VEIN THROMBOSIS (DVT) OF AXILLARY VEIN OF LEFT UPPER EXTREMITY (CMS/HCC): Status: RESOLVED | Noted: 2021-11-29 | Resolved: 2023-04-20

## 2023-04-20 PROBLEM — C61 PROSTATE CANCER (CMS/HCC): Status: RESOLVED | Noted: 2020-11-23 | Resolved: 2023-04-20

## 2023-04-20 PROCEDURE — 99214 OFFICE O/P EST MOD 30 MIN: CPT | Performed by: STUDENT IN AN ORGANIZED HEALTH CARE EDUCATION/TRAINING PROGRAM

## 2023-04-20 PROCEDURE — 3008F BODY MASS INDEX DOCD: CPT | Performed by: STUDENT IN AN ORGANIZED HEALTH CARE EDUCATION/TRAINING PROGRAM

## 2023-04-20 PROCEDURE — 3074F SYST BP LT 130 MM HG: CPT | Performed by: STUDENT IN AN ORGANIZED HEALTH CARE EDUCATION/TRAINING PROGRAM

## 2023-04-20 PROCEDURE — 3078F DIAST BP <80 MM HG: CPT | Performed by: STUDENT IN AN ORGANIZED HEALTH CARE EDUCATION/TRAINING PROGRAM

## 2023-04-20 RX ORDER — VARDENAFIL HYDROCHLORIDE 10 MG/1
TABLET ORAL
COMMUNITY
Start: 2023-01-16

## 2023-04-20 RX ORDER — HEPARIN 100 UNIT/ML
500 SYRINGE INTRAVENOUS
COMMUNITY

## 2023-04-20 RX ORDER — LISINOPRIL 10 MG/1
10 TABLET ORAL DAILY
COMMUNITY
Start: 2023-01-16

## 2023-04-20 RX ORDER — SODIUM CHLORIDE 9 MG/ML
10 INJECTION, SOLUTION INTRAMUSCULAR; INTRAVENOUS; SUBCUTANEOUS
COMMUNITY

## 2023-04-20 NOTE — PROGRESS NOTES
[facility] IN [place]  Patient Name:  Des Moran (MRN [id number])    YOB: 1965 (57 y.o.)    Sex:  male    Gender:  male       4/20/2023       CC:  Pre-op Exam (Clearance, no EKG. Needs basic clearance. No form- office note stating he is clear for procedure. Cancer Surgery 4/27/23 being done at [facility], Dr. Yepez, [facility])          Being seen in consultation at the request of:  Dr. Yepez      Pre-operative evaluation for:  appendiceal carcinoma      Date of procedure:  4/27/23      Appendix carcinoma  -colonoscopy on 11/1/22 - pathology with poorly differentiated adenocarcinoma  -off of chemo for 10 weeks now. Follows heme/onc  -had partial bowel obstruction, now on low residual diet and laxatives. He is having normal bowel movements now  -was on medical MJ for nausea during chemo     HTN  -on lisinopril 10 mg    BP Readings from Last 3 Encounters:   04/20/23 110/78   02/02/23 126/76   12/27/22 130/82       Hx DVT LUE, hx PE 5/2022  -on xarelto 20 mg - stopped a week before surgery    Prostate cancer  -radical prostatectomy in 12/2020 with Dr. Curtis SILVER  -  -stopped crestor 10 mg when he started chemo, has not been on since    Insomnia  -melatonin 6 mg qhs    ED  -takes Cialis more often than Levitra      1.  Cardiovascular Risk Assessment:    Does patient have Coronary artery disease?  no  Has patient had a coronary stent inserted in the past year? no  The patient's functional status is METs>4     The patient is scheduled for elective appendiceal carcinoma which is an intermediate risk surgical procedure.      The patient's Revised cardiac risk index(RCRI) score is 1 for intermediate/high-risk type of surgery    The patient's risk is low and he should proceed      2.  Pulmonary Risk Assessment   Screening for obstructive sleep apnea:    STOPBANG:  Snores loudly? Intermittent, loud  Feels tired, fatigue, or sleepy during the day time? Not  usually  Was observed by another individual to stop breathing at while sleeping? no  Diagnosis of hypertension? yes  BMI more than 35? no  Age more than 50? yes  Neck circumference more than 40 cm? no  Gender (male)? yes    SCORE: 4 - high risk      3.  Pain control   Pain management to be determined by surgical service.       4.  Venous Thromboembolism (VTE) Prophylaxis   Selection of VTE prophylaxis is at the discretion of the surgeon.         5.  Adrenal Insufficiency Risk Stratification:    Steroid exposure in past year includes: none significant    6.  Hypertension:    -stable on lisinopril      7. History of problems with anesthesia:  none      8. History of surgical complications:  none           Patient Active Problem List   Diagnosis   • Seasonal allergies   • Hypercholesterolemia   • Other male erectile dysfunction   • Cold sore   • Encounter for general adult medical examination without abnormal findings   • Appendix carcinoma (CMS/[facility])   • History of pulmonary embolism   • Primary hypertension       Past Medical History:   Diagnosis Date   • Acute deep vein thrombosis (DVT) of axillary vein of left upper extremity (CMS/[facility]) 11/29/2021   • Prostate cancer (CMS/[facility])        Current Outpatient Medications   Medication Sig Dispense Refill   • 0.9 % sodium chloride (sodium chloride 0.9 %) solution Infuse 10 mL into a venous catheter.     • fluorouraciL in sodium chloride 0.9 % chemo infusion - for home use Infuse 4,944 mg into a venous catheter every 14 days.     • lisinopriL (PRINIVIL) 10 mg tablet Take 10 mg by mouth daily.     • medical marijuana THIS ENTRY IS FOR DOCUMENTATION PURPOSES OF MEDICAL CANNABIS USE ONLY AND DOES NOT INDICATE THE PROVIDER OF NOTE PRESCRIBED THIS MEDICATION. THIS ENTRY MAY NOT BE USED TO PRESCRIBE THIS PRODUCT.     • rivaroxaban (XARELTO) 20 mg tablet Take 20 mg by mouth daily. Stopping temporarily today in prep for surgery on 4/27/23     • tadalafil (CIALIS) 5 mg tablet Take 1  "tablet (5 mg total) by mouth daily. 30 tablet 11   • vardenafiL (LEVITRA) 10 mg tablet TAKE 1 TABLET BY MOUTH 2-3 TIMES PER WEEK AS NEEDED     • heparin, porcine, PF, 100 unit/mL syringe flush 500 Units.     • melatonin 5 mg tablet Take 5 mg by mouth nightly.     • rosuvastatin 10 mg tablet Take 1 tablet (10 mg total) by mouth once daily. 30 tablet 5     No current facility-administered medications for this visit.       Allergies   Allergen Reactions   • No Known Allergies      Family History   Problem Relation Age of Onset   • Breast cancer Biological Mother 48        passed age 51   • Hypertension Biological Mother    • Prostate cancer Biological Father         later in life   • Alcohol abuse Biological Father    • Bipolar disorder Biological Sister    • Alcohol abuse Biological Sister    • Cirrhosis Paternal Grandmother    • Heart disease Mother's Brother      Social History     Socioeconomic History   • Marital status:      Spouse name: None   • Number of children: None   • Years of education: None   • Highest education level: None   Occupational History   • Occupation:      Comment: self employed   Tobacco Use   • Smoking status: Some Days     Types: Cigars   • Smokeless tobacco: Never   • Tobacco comments:     RARE cigar   Substance and Sexual Activity   • Alcohol use: Yes     Alcohol/week: 10.0 standard drinks of alcohol     Types: 10 Cans of beer per week   • Drug use: No   • Sexual activity: Yes     Partners: Female      Past Surgical History:   Procedure Laterality Date   • PROSTATECTOMY  12/21/2020       OBJECTIVE:      Vitals:    04/20/23 0946   BP: 110/78   BP Location: Right upper arm   Patient Position: Sitting   Pulse: 77   Resp: 16   Temp: 36.8 °C (98.2 °F)   TempSrc: Temporal   SpO2: 97%   Weight: 90.9 kg (200 lb 6.4 oz)   Height: 1.778 m (5' 10\")       Body mass index is 28.75 kg/m².  Wt Readings from Last 3 Encounters:   04/20/23 90.9 kg (200 lb 6.4 oz)   02/02/23 92.1 " kg (203 lb)   12/27/22 93.7 kg (206 lb 9.6 oz)       General: No apparent distress, well nourished    Head: Normocephalic, atraumatic    Eye Exam: PERRL, EOMI, Conjunctiva are pink and non-injected, sclera clear    Ears: External ears normal, Canals clear, TM's bilaterally without effusion, erythema, bulging    Nose: no mucosal erythema, no mucosal edema, no purulent discharge  Oropharynx: no exudate, no erythema, lips, buccal mucosa, and tongue normal, mucous membranes are moist. Dentition without apparent issues    Neck: supple, no cervical adenopathy, no thyromegaly    Cardiovascular: +S1, +S2, regular rate, regular rhythm, no murmurs, no lower extremity edema    Respiratory: Breathing comfortably, no wheezes, no rales, no rhonchi    GI: soft, +distended, discomfort in LUQ otherwise non-tender to palpation, no guarding, no masses, normal bowel sounds    Neuro: alert, sensation intact to light touch throughout, muscle strength 5/5 throughout UE and LE bilaterally, CN 2-12 are grossly intact    Musculoskeletal:  ROM intact of UE and LE bilaterally, no joint swelling, no joint erythema, no joint warmth, no joint tenderness, no joint deformities, no muscle spasm appreciated    Psychiatric:  Mood is euthymic, affect is euthymic    Skin (on areas not covered by clothes): No rashes, no lesions, no erythema, no bruising, no cyanosis            ASSESSMENT AND RECOMMENDATIONS:        He is medically optimized for surgery for appendiceal carcinoma on 4/27/23.  I recommend he follow up after surgery for labs and to resume statin, as well as to discuss testing for sleep apnea. VSS  He is currently holding Fei Moran is a 57 y.o. male here for preop evaluation.    1. Preop examination    2. Appendix carcinoma (CMS/HCC)    3. Primary hypertension    4. Hypercholesterolemia    5. History of prostate cancer    6. History of DVT (deep vein thrombosis)    7. History of pulmonary embolism          PLAN:       Diagnoses and all orders for this visit:    Preop examination (Primary)    Appendix carcinoma (CMS/HCC)    Primary hypertension    Hypercholesterolemia    History of prostate cancer    History of DVT (deep vein thrombosis)    History of pulmonary embolism          Whitney Phillips DO

## 2023-04-24 ENCOUNTER — TELEPHONE (OUTPATIENT)
Dept: PRIMARY CARE | Facility: CLINIC | Age: 58
End: 2023-04-24
Payer: COMMERCIAL

## 2023-04-24 NOTE — TELEPHONE ENCOUNTER
Patient is having surgery, Gogo from [facility] called in today to speak with our nurse about post op call for this patient    [phone]    Please Advise

## 2023-04-24 NOTE — TELEPHONE ENCOUNTER
S/W Nurse navigator , pt has appendix cancer and is scheduled for cytoreduction surgery on 4/27/23  And the heated peritoneal chemo at [facility] by Dr Olegario Bernal.   Gogo just wanted to make you aware , pt will most likely be in the hospital 10-14 days and once pt is home he will have home care but if pt needs anything she wanted you to know since they are 4 hours away. [facility] health records can be seen in care everywhere

## 2023-08-09 ENCOUNTER — APPOINTMENT (OUTPATIENT)
Dept: URBAN - METROPOLITAN AREA CLINIC 203 | Age: 58
Setting detail: DERMATOLOGY
End: 2023-08-09

## 2023-08-09 DIAGNOSIS — L57.8 OTHER SKIN CHANGES DUE TO CHRONIC EXPOSURE TO NONIONIZING RADIATION: ICD-10-CM

## 2023-08-09 PROCEDURE — 99213 OFFICE O/P EST LOW 20 MIN: CPT

## 2023-08-09 PROCEDURE — OTHER SUNSCREEN RECOMMENDATIONS: OTHER

## 2023-08-09 PROCEDURE — OTHER COUNSELING: OTHER

## 2023-08-09 ASSESSMENT — LOCATION SIMPLE DESCRIPTION DERM: LOCATION SIMPLE: CHEST

## 2023-08-09 ASSESSMENT — LOCATION ZONE DERM: LOCATION ZONE: TRUNK

## 2023-08-09 ASSESSMENT — LOCATION DETAILED DESCRIPTION DERM: LOCATION DETAILED: LEFT MEDIAL INFERIOR CHEST

## 2024-02-21 ENCOUNTER — PATIENT OUTREACH (OUTPATIENT)
Dept: CASE MANAGEMENT | Facility: CLINIC | Age: 59
End: 2024-02-21
Payer: COMMERCIAL

## 2024-02-21 NOTE — PROGRESS NOTES
LVM for SAKINA # 1 with request for a return call.          Edita Francois MSN, RN  Ambulatory Care Manager  [phone]

## 2024-02-22 NOTE — PROGRESS NOTES
SAKINA # 2 lvm and requested a return call.        Edita Francois MSN, RN  Ambulatory Care Manager  [phone]

## 2024-02-23 NOTE — PROGRESS NOTES
NAME: Des Moran    MRN: [id number]    YOB: 1965    Event Review:    Initial TCM Patient Outreach Date: 02/21/24        Discharge Diagnosis: Hypervolemia       Discharging Facility: [facility]  Date of Last Admission: 02/18/24  Date of Last Discharge: 02/20/24    Two outreach calls were made to patient within 2 business days post-discharge date of  02/20/24 . I was not able to speak directly with patient, the attempts satisfy requirements for TCM.    Edita Francois MSN, RN  Ambulatory Care Manager  [phone]

## 2024-03-06 ENCOUNTER — TRANSCRIBE ORDERS (OUTPATIENT)
Dept: SCHEDULING | Age: 59
End: 2024-03-06
Payer: COMMERCIAL

## 2024-03-06 DIAGNOSIS — C18.1 APPENDIX CARCINOMA (CMS/HCC): Primary | ICD-10-CM

## 2024-03-06 NOTE — H&P (VIEW-ONLY)
Mr. Moran presents today for a follow-up visit.  He is an 58 y.o. male with adenocarcinoma of omentum.    History of Present Illness:     Patient is a 58 y.o. male with PMHx including HLD and prostate cancer who presents today for follow-up visit after recent hospitalization.     Patient began experiencing abdominal pain and distention, inability to pass gas, constipation, nausea and vomiting on 10/22/21. He tried using Dulcolax, without relief. After several days of no bowel movement, he called his oncologist he followed with for previous prostate cancer, Dr. Evelina Schmitz, who ordered CT scan. CT scan on 10/28/21 revealed bowel obstruction with possible mass vs. adhesion at terminal ileum as well as thickening of mesentery concerning for carcinomatosis.    He was directed to the ER for further evaluation. workup to determine etiology. He was admitted and evaluated by heme/onc, GI and surgery. He had CEA of 7.5. He was NPO, given IV fluids and IR was consulted and he underwent omental biopsy on 10/29. His diet was advanced to clear and he had multiple bowel movements with bowel regimen. He underwent colonoscopy on 11/1 with biopsy. Pathology was positive for poorly differentiated adenocarcinoma.    He was discharged and advised to follow up with hematology/oncology outpatient for chemotherapy treatment.     He had chest port placed on 11/5/21 by Dr. Mendoza.     We attempted to obtain PET scan but this was denied by insurance.     He had CT scan of chest on 11/10/21 revealing stable pulmonary nodules indeterminate in etiology, filling defect adjacent to his central line catheter likely representing fibrin sheath or thrombus, but no evidence of metastatic disease. He does have glue residue/skin irritation from where his catheter was placed and reports that it has been flaking off recently. He also reports that it has felt somewhat itchy for the first time today. He is not having any issues with swelling or other  symptoms.    He is currently on full liquid diet and is not having any bowel issues at this time.     He was diagnosed with prostate cancer about a year ago and had prostatectomy on 2020 by Dr. Olegario Acevedo. His PSA has been undetectable since.    He has been fully vaccinated for COVID, including his booster vaccination.    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     He had plans to go to [place] for [place] with family from - and would like to still go if possible.    His mother  of breast cancer at age 52.     He began cycle #1 of FOLFOX on 21. Avastin was added cycle 2.    He continued treatment and was tolerating fairly well except for some nausea. He was hesitant to take Zofran due to potential constipation side effect and him already suffering from chronic constipation. He was requesting medial marijuana and had completed online registration. He was doing yoga for exercise. He also reported cold sensitivity for about 4-5 days after treatment, which was manageable.     He completed his treatment with FOLFOX/Avastin and was scheduled to see Dr. Lomeli for surgical recommendations on 2022.  His labs were stable.  He still had some neuropathy in his fingers and toes, but not affecting his ADLs.  He was slowly recovering and fatigue level was less.  He did have an incidental finding of PE on CT of the chest May 9th and went to the ER, but was discharged to home on Xarelto.  He was tolerating without any abnormal bleeding.       He followed up with Dr. Lomeli, oncologic surgery on 22 who recommended diagnostic laparoscopy to determine suitability for debulking and HIPEC surgery. He underwent this laparoscopy on 22 and held his Xarelto for two days and then resumed it. He continues Xarelto and is tolerating well without any issues with bleeding. He had bioppsy of his right diaphragm during this  procedure and pathology was consistent with fibrous and skeletal muscle tissue, no malignancy. He followed back up with Dr. Lomeli in the office on 7/6/22 to discuss results who reported he had several spots on his diaphragn that concerned him but he could not take biopsies from all of of them and he recommend he continue chemotherapy treatment and consider possible repeat laparascopy in 3-4 months to assess suitability for CRS, HIPEC again. His wound from this surgery has been healing but has had some dehiscence of it. He was advised to wait a couple weeks to start vigorous/core exercises and then start slowly and build up to normal level or activity.     He completed treatment with FOLFOX/Avastin, receiving last treatment of FOLFOX only without Avastin on 11/3/22. He tolerated treatment fairly well but with some minor bowel issues which have basically resolved at this point. He reports some fatigue and nausea for about 24 hours following treatment that then improves. He had restaging CT scans on 11/9/22 revealing stable lung nodules, no new lung nodules or masses, grossly unchanged regions of terminal/distal ileal wall thickening, as well as soft tissue thickening of the appendix, and confluent omental carcinomatosis spanning 11.2 cm that was grossly unchanged.     He had a telemedicine visit with Dr. Lomeli, oncologic surgery on 11/21/22 and in the office on 11/30/22. He had labs on 12/1/22 notable for normal CBC with different with exception of slightly low platelet count of 136,000, normal CMP, and normal CEA of 1.3.    He then underwent a laparoscopy with Dr. Lomeli on 12/13/22. He had biopsies taken of his right diaphragm which was consistent with residual disease with poorly differentiated adenocarcinoma, diffuse poorly cohesive type, involving fibrous tissue, but sample with insufficient for molecular/immunologic analysis. He had telemedicine visit with Dr. Lomeli again on 12/19/22 and  discussed these results and next treatment recommendations. Patient states that Dr. Lomeli described area of residual disease as more so small areas of abnormality rather than a formed mass which he thinks would be difficult to remove by surgery and he recommends continuing systemic treatment here. He continues to have persistent neuropathy and does have some difficulty with using his hands for tasks and fine motor skills but is able to tolerate. He reports some relief with acupuncture. He denies any other prolonged symptoms from prior treatment, and reports he was able to tolerate fairly well otherwise without any real affect on his normal daily activities. He denies even having to miss work while undergoing treatment. His abdominal incision site continues to heal. He is still interested in exploring any possible experimental treatment options as well.    He was seen in telemedicine consultation with Dr. Lauren in [place] and is scheduled for surgery and intraperitoneal chemotherapy the first week of April.     He underwent surgery with Dr. Yepez on 4/27/23  which included an exploratory laparotomy with TILA, right hemicolectomy with staple ileocolic anastomosis, small bowel resection with primary stapled enteroenterostomy, wedge segment of hepatic segment 6 resection of right diaphragm, resection of multiple mesenteric masses, multiple peritonectomies, partial splenectomy, resection of right seminal vesicle, resection and repair of anterior wall of rectum, resection of pelvic mass, HIPEC with MM-C: PCI - 21 and CCR = 0 at [facility]. Pathology demonstrated poorly differentiated SRC.  He did not require a colostomy bag.     He was recovering well from surgery. He had some occasional fatigue and his BM's had been loose from time to time.    He had repeat CT scan of abdomen/pelvis on 8/1/23 demonstrating increasing peritoneal carcinomatosis.    He had repeat CT scan of abdomen/pelvis on 10/30/23 notable for  peritoneal carcinomatosis mildly increased compared to prior study, and new nonocclusive portal vein thrombus.     I He has recovered well from surgery and prior treatment other than some neuropathy in his toes and balls of his feet, but his fingers are fine. His abdominal pain has resolved but he has noticed some stiffness in his abdomen since he started working out since surgery. He is feeling very well overall. He has continued follow up with Dr. Yepez and last saw him on 12/15/23 and thought he was doing well, recommended he continue follow up here. He continues port flushes, last was on 1/2/24. He had labs on 1/2/24 notable for normal CBC with differential, normal CMP except for slightly low sodium of 135, and CEA slightly elevated at 6.9. He had repeat CT scan of abdomen/pelvis on 1/2/24 notable for new right pleural effusion with compressive atelectasis, bibasilar interstitial prominence, increased abdominal and pelvic ascites, post-surgical changes, some increased in size peritoneal nodules, mesenteric fat stranding, and retroperitoneal lymph nodes. We have decided to proceed with observation.      He underwent a thoracentesis on 2/8/24 with 1.1 liters of slightly cloudy yellow pleural fluid removed an cytopathology of right pleural fluid with consistent with rate atypical cells with degenerated cells in background mesothelial cells, no evidence of malignancy. He denies any swelling in his legs.     Interval History- 3/6/24- here today for follow up visit.  Dileep has had a difficult several weeks, worse over the last weekend with increased shortness of breath, abdominal distention and insomnia.  He is wearing O2 via NC today due to shortness of breath with any activity.  He did have a repeat thoracentesis done on 2/19/24 with malignant cells consistent with met adenocarcinoma.  He is scheduled for a paracentesis tomorrow morning at [place].  He is accompanied by his wife, Sujatha, today as he is not driving.        Past Medical History:  He has a past medical history of Erectile dysfunction, Hypercholesterolemia, and Prostate cancer (CMS-HCC).  Past Surgical History:  He has a past surgical history that includes RP Prostate Biopsy; pr lap,prostatectomy,radical,w/nerve spare,incl robotic (N/A, 12/21/2020); laparoscopy, lymphadenectomy (N/A, 12/21/2020); RP Abdomen Biopsy (10/29/2021); pr colonoscopy w/biopsy single/multiple (N/A, 11/1/2021); insertion, tunneled venous access device, req 2 catheters via 2 sites; w/subq port/pump (Left, 11/5/2021); laparoscopy, biopsy (N/A, 6/16/2022); laparoscopy, biopsy (N/A, 12/13/2022); RP Thoracentesis (2/8/2024); and RP Thoracentesis (2/19/2024).  Allergy List:     1) Drug [Other] - Adhesives: Please use IV 3000  2) Tape - Traditional shakir-cath dressing causes redness/itching  Current Medication List:  1) Benzonatate 200 mg capsule, take 1 capsule by mouth every 8 hours as needed (couph).    2) Furosemide 20 mg tablet, take 1 tablet by mouth daily.    3) Lisinopril 10 mg tablet, take 1 tablet by mouth daily at bedtime. (Patient taking differently: Take 1 tablet by mouth daily.)    4) Melatonin 5 mg tablet, take 1 tablet by mouth daily at bedtime.    5) Rivaroxaban 20 mg tablet, take 1 tablet by mouth daily. (Patient taking differently: Take 1 tablet by mouth daily with breakfast.)    6) Spironolactone 50 mg tablet, take 1 tablet by mouth daily. (Patient not taking: Reported on 3/6/2024)    7) Tadalafil 5 mg tablet, take 1 tablet by mouth daily. (Patient taking differently: Take 1 tablet by mouth daily as needed.)        Family History:   Prostate Cancer: Father  Social History:    Marital Status:                  Number of children:               Occupation:                                                  Tobacco Use: Quit          Packs/Day:       Years:            Types: Cigars    Alcohol Use: Yes                Comment: socially    Drug Use:    Never             Review of  "Systems   Constitutional:  Negative for appetite change and fever.   HENT:  Negative for mouth sores, nosebleeds, postnasal drip and trouble swallowing.    Respiratory:  Positive for cough and shortness of breath.    Cardiovascular:  Negative for chest pain and leg swelling.   Gastrointestinal:  Positive for abdominal distention. Negative for abdominal pain, blood in stool and vomiting.        Shortness of breath due to increased abdominal distention.    Genitourinary:  Positive for frequency.        + genital swelling.   Musculoskeletal: Negative.  Negative for myalgias.   Skin:  Negative for rash.   Neurological:  Positive for numbness (fingertips and toes and balls of feet).   Hematological:  Does not bruise/bleed easily.   Psychiatric/Behavioral:  Positive for sleep disturbance (chronic, nocturia). The patient is not nervous/anxious.    All other systems reviewed and are negative.      Vitals:  /78   Pulse 87   Temp 97.6 °F (36.4 °C) (Temporal)   Ht 5' 10\" (1.778 m)   Wt 186 lb (84.4 kg)   SpO2 96%   BMI 26.69 kg/m²     Physical Exam  Vitals and nursing note reviewed.   Constitutional:       General: He is not in acute distress.     Appearance: Normal appearance. He is well-developed. He is not diaphoretic.   HENT:      Head: Normocephalic and atraumatic.      Mouth/Throat:      Mouth: Mucous membranes are moist.      Pharynx: Oropharynx is clear. No oropharyngeal exudate or posterior oropharyngeal erythema.   Eyes:      General: No scleral icterus.     Pupils: Pupils are equal, round, and reactive to light.   Neck:      Vascular: No JVD.   Cardiovascular:      Rate and Rhythm: Regular rhythm.      Heart sounds: No murmur heard.     No friction rub. No gallop.   Pulmonary:      Effort: Pulmonary effort is normal.      Breath sounds: Examination of the right-lower field reveals decreased breath sounds. Decreased breath sounds present.   Chest:      Comments: Chest wall port in place.  Abdominal:      " General: Bowel sounds are normal. There is distension (modest without tenderness).      Palpations: Abdomen is soft. There is no mass.      Tenderness: There is no abdominal tenderness.   Musculoskeletal:         General: No swelling or tenderness. Normal range of motion.      Cervical back: Normal range of motion and neck supple.      Right lower leg: No edema.      Left lower leg: No edema.   Lymphadenopathy:      Cervical: No cervical adenopathy.   Skin:     General: Skin is warm and dry.      Findings: No lesion.   Neurological:      Mental Status: He is alert and oriented to person, place, and time.      Deep Tendon Reflexes: Reflexes normal.   Psychiatric:         Mood and Affect: Mood normal.         Behavior: Behavior normal.         Thought Content: Thought content normal.         Judgment: Judgment normal.         Laboratory Data:  Hem Onc Labs Latest Ref Rng & Units 3/6/2024   WBC 3.50 - 10.00 K/uL 8.88   HEMOGLOBIN 13.0 - 17.7 g/dL 13.4   HEMATOCRIT 38.0 - 52.0 % 38.0   PLATELETS 150 - 400 K/uL 294   % Neutrophils 40.0 - 75.0 % 85(H)   # Neutrophils - 7.53(H)   % LYMPHOCYTES 14.0 - 45.0 % 6(L)   MEAN CELLULAR VOLUME 81.0 - 100.0 fL 83.7   GLUCOSE - PENDING   UREA NITROGEN - PENDING   CREATININE - PENDING   SODIUM - PENDING   POTASSIUM - PENDING   CHLORIDE - PENDING   CARBON DIOXIDE - PENDING   CALCIUM - PENDING   PROTEIN TOTAL - PENDING   ALBUMIN - PENDING   BILIRUBIN TOTAL - PENDING   ALKALINE PHOSPHATASE - PENDING   BILITOTAL - PENDING   AST - PENDING   ALT - PENDING   URIC ACID 2.3 - 7.6 mg/dL -   MAGNESIUM 1.8 - 2.6 mg/dL -   PROTEIN TOTAL - PENDING   Some recent data might be hidden       Imaging:      CT scan of abdomen/pelvis  10/28/2021  5:29 PM - [facility], [facility]    IMPRESSION:         1.  Infiltration of the greater omentum, suspicious for peritoneal carcinomatosis.  2.  Dilation of the distal ileum with fecalization of contents with narrowed distal 2 cm of the terminal ileum with  wall thickening. Findings are consistent with partial small bowel obstruction caused by abnormal terminal ileum with differential considerations including stricture, inflammation, or potentially neoplastic etiology. Recommend further evaluation with colonoscopy in assessment of the terminal ileum. There is a bandlike soft tissue attenuation structure adjacent to the terminal ileum which could potentially represent an adhesion or tumor implant contributing to suspected partial small bowel obstruction. The appendix is not definitively identified.             CT scan of chest  11/10/2021  6:15 PM - [facility], [facility]    IMPRESSION:         1.  Grossly stable sub-6 mm solid pulmonary nodules, indeterminate in etiology. No other potential metastatic disease is seen in the chest.  2.  Apparent hypoattenuating filling defect adjacent to indwelling catheter in left brachiocephalic vein, likely fibrin sheath or thrombus. Apparent moderate stenosis of the proximal left brachiocephalic vein.        US of LUE  11/29/2021 11:48 AM - [facility], [facility]    IMPRESSION:   There is occlusive thrombosis of the left basilic, axillary and subclavian veins, a partial clot in the left innominate vein, and slow flow in the left internal jugular vein.        +CRITICAL RESULT: Left upper extremity deep vein thrombosis.       CT A/P 2/3/22:  -Interval decreased size of biopsy-proven metastatic carcinomatosis involving the greater omentum.  -Irregular appendiceal wall thickening suspicious for malignancy.  -Interval resolution of distal small bowel obstruction since 10/20/2021. Two short segment strictures of the terminal ileum favored to be post-inflammatory given the prior negative biopsy.     CT of the chest/abdomen and pelvis -  May, 2022  5/13/2022 12:32 PM - [facility], [facility]      IMPRESSION:         1.  Acute segmental and subsegmental thromboembolism in the right upper and right lower lobes with right  lower lobe subsegmental peripheral pulmonary infarction.     2.  Stable small pulmonary nodules favoring benign etiology    5/13/2022  9:51 AM - [facility], [facility]      IMPRESSION:      1.  No substantial interval change in regions of terminal/distal ileal wall thickening, as well as thickening of the appendix when compared to CT dated 2/3/2022.  2.  Confluent omental carcinomatosis spans 11.5 cm, unchanged.    CT of chest  11/10/2022  9:25 AM - [facility], [facility]    IMPRESSION:         Scattered sub-6 mm nodules in both lungs are stable. No new lung nodules or masses.     Pulmonary nodule follow-up recommendation:       CT of abdomen/pelvis  11/9/2022  5:22 PM - [facility], [facility]    IMPRESSION:         1.  Grossly unchanged regions of terminal/distal ileal wall thickening, as well as soft tissue thickening of the appendix.  2.  Confluent omental carcinomatosis spans 11.2 cm, grossly unchanged.     CT of abdomen/pelvis  8/1/2023  4:44 PM - [facility], [facility]    IMPRESSION:         Increasing peritoneal carcinomatosis as described.    CT of abdomen/pelvis  10/30/2023  9:48 AM - [facility], [facility]    IMPRESSION:         1.  Peritoneal carcinomatosis mildly increased compared to prior study.  2.  New nonocclusive portal vein thrombus, as detailed above.     CT of abdomen/pelvis  1/2/2024 11:19 AM - [facility], [facility]    IMPRESSION:            1. New right pleural effusion with compressive atelectasis.  2. Bibasilar interstitial prominence, a nonspecific but new finding. Lymphangitic carcinomatosis must be considered.  3. Abdominal and pelvic ascites, increased from the previous exam.  4. Postsurgical changes from partial hepatectomy and a hypodense lesion within the left lobe, see comments above.  5. Peritoneal nodules, presumed to represent metastatic implants, mesenteric fat stranding, and retroperitoneal lymph nodes, some of which are increased in size,  "are concerning for progression of disease.  6. Presumed filling defect within the main portal vein is no longer visualized.       Pathology:    Final Diagnosis   Part A: \"Omental mass biopsy \" -  Poorly differentiated adenocarcinoma with   signet ring cells.  See note.     Note:  Immunostains, with appropriate controls, are performed.  Tumor cells are   positive for AE1/AE3, SATB 2, and CK20 and are negative for CK7 and NKX3.1.   These findings support the diagnosis and are most suggestive of a colonic or   appendiceal primary.        Final Diagnosis   Part A: \"Terminal ileum, biopsy\"  Fragments of unremarkable ileal mucosa.   Negative for malignancy.     Part B: \"Colon, appendiceal orifice in cecum, biopsy\"  Fragments of colorectal   mucosa with active inflammation and histologic features most suggestive of   region of erosion(s).  Negative for malignancy.      Final Diagnosis   A. Diaphragm, right, biopsy:   - Poorly differentiated adenocarcinoma, diffuse (poorly cohesive) type,   involving fibrous tissue.        Diagnostic Laparoscopy with Dr. GARBER on 6/16/22  Final Diagnosis:  A. Diaphragm, right, biopsy:  - Scant fragments of fibrous tissue and skeletal muscle.  The case material was reviewed and the report verified by: Makenzie Munguia MD PhD  (Electronic signature)  Report Date/Time: 06/20/2022 15:57 PM EDT     Surgery with Dr. Yepez on 4/27/23  Surgical Pathology  Order: [id number]  Component 1 mo ago   [facility] Lab Result Report  [facility] Surgical Pathology                             Case: [id number]                                 Authorizing Provider:  Olegario Yepez MD         Collected:           04/27/2023 1002               Ordering Location:     [facility]         Received:            04/28/2023 0957                                      PERIOPERATIVE SERVICES                                                       Pathologist:           Kristian Yeh MD                                                   "       Intraop:               Jasiel Wilkerson MD                                                             Specimens:   1) - Omentum, OMENTUM                                                                                2) - Colon, RIGHT COLON AND TERMINAL ILEUM                                                          3) - Mesentery, MESENTERIC MASSES                                                                    4) - Peritoneum, PELVIC MASS                                                                        5) - Diaphragm, Left diaphragm                                                                      6) - Liver, Hepatic segment 7 mass                                                                  7) - Diaphragm, RIGHT DIAPHRAGM                                                                      8) - Small intestine, SMALL BOWEL                                                                    9) - Spleen, PORTION OF SPLEEN                                                                      10) - Seminal vesicle, RIGHT SEMINAL VESICLE                                                        11) - Peritoneum, PERITONEAL NODULES                                                    Addendum 1     The tumor shows INTACT nuclear expression of MMR proteins by immunohistochemistry (please also see the attached Biomarker Report).      Addendum electronically signed by Kristian Yeh MD on 5/11/2023 at 12:57 PM   Final Diagnosis     PART 1:  OMENTUM, OMENTECTOMY:               A.  RESIDUAL POORLY DIFFERENTIATED ADENOCARCINOMA WITH SIGNET RING CELL FEATURES, STATUS POST NEOADJUVANT CHEMOTHERAPY.                B.  FIBROELASTOSIS, MACROPHAGE AGGREGATES, AND FAT NECROSIS, SUGGESTIVE OF PARTIAL THERAPY RESPONSE.                C.  FOCI OF CARCINOMA MEASURE UP TO 6 MM IN SIZE.      PART 2:  COLON, RIGHT COLON AND TERMINAL ILEUM, RIGHT HEMICOLECTOMY:               A.  HIGH GRADE APPENDICEAL GOBLET CELL  "ADENOCARCINOMA.                B.  G3, POORLY DIFFERENTIATED.                C.  CARCINOMA DIFFUSELY INVOLVES THE APPENDIX, CECUM, ADJACENT COLON, AND ILEAL SEROSA AND BOWEL WALL.                D.  LYMPHOVASCULAR INVASION PRESENT.                E.  PERINEURAL INVASION PRESENT.                F.  METASTATIC CARCINOMA PRESENT IN THIRTEEN OF FOURTEEN LYMPH NODES. (13/14)               G.  MULTIPLE TUMOR DEPOSITS IDENTIFIED.      PART 3:  MESENTERY, MESENTERIC MASSES, EXCISION:               MICROSCOPIC FOCI OF POORLY DIFFERENTIATED CARCINOMA, INVOLVING FIBROADIPOSE TISSUE.      PART 4:  PERITONEUM, PELVIC MASS, EXCISION:               POORLY DIFFERENTIATED ADENOCARCINOMA, INVOLVING FIBROMUSCULAR TISSUE AND ADIPOSE.      PART 5:  DIAPHRAGM, LEFT DIAPHRAGM, BIOPSY:               MICROSCOPIC FOCI OF POORLY DIFFERENTIATED CARCINOMA, INVOLVING FIBROMUSCULAR TISSUE.      PART 6:  LIVER, HEPATIC SEGMENT 7 MASS, WEDGE RESECTION:               A.  MICROSCOPIC FOCI OF POORLY DIFFERENTIATED CARCINOMA INVOLVING FIBROUS TISSUE.                B.  NECROTIC NODULE WITH DYSTROPHIC CALCIFICATIONS.                C.  THIN RIM OF LIVER TISSUE WITH MARKED THERMAL CAUTERY ARTIFACT.      PART 7:  DIAPHRAGM, RIGHT DIAPHRAGM, BIOPSY:               A.  POORLY DIFFERENTIATED ADENOCARCINOMA WITH SIGNET RING CELL FEATURES.                B.  CARCINOMA INVOLVES FIBROADIPOSE TISSUE.                C.  SKELETAL MUSCLE, NEGATIVE FOR CARCINOMA.      PART 8:  SMALL INTESTINE, NOT OTHERWISE SPECIFIED, RESECTION:               A.  LENGTH OF SMALL BOWEL WITH ACUTE SEROSITIS AND REACTIVE CHANGES.                B.  NO CARCINOMA IDENTIFIED.      PART 9:  SPLEEN, \"PORTION OF SPLEEN\", BIOPSY:               A.  FRAGMENT OF SPLENIC TISSUE WITH DENSE FIBROSIS, DYSTROPHIC CALCIFICATION, AND MARKED THERMAL CAUTERY ARTIFACT.                B.  NO DEFINITIVE CARCINOMA IDENTIFIED.      PART 10:  SEMINAL VESICLE, RIGHT, EXCISION:               POORLY DIFFERENTIATED " CARCINOMA INVOLVING SEMINAL VESICLE.      PART 11:  PERITONEUM, PERITONEAL NODULES, EXCISION:               POORLY DIFFERENTIATED CARCINOMA, INVOLVING FIBROADIPOSE TISSUE, SMOOTH MUSCLE, AND FRAGMENT OF COLONIC TISSUE.         PATHOLOGIC STAGE ([facility] 8TH EDITION):  ypT4b pN2 pM1b        COMMENT:  Part 2:  Histologic sections show a diffusely invasive poorly differentiated tumor arising from the appendix. The tumor cells are seen in single files, cords, streaming sheets, and nests, and exhibit signet ring and goblet cell features. A focal residual low grade component is identified (best seen in block 2i), composed of rounded nests and well-formed tubules. By immunohistochemical stains, the tumor cells are positive for CK20, CDX2, synaptophysin (variable), and chromogranin (variable), whereas CK7 and NKX3.1 are negative.      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      Please see the attached Synoptic Report.      Final tissue findings correlate with intraoperative frozen section results.      Electronically signed by Kristian Yeh MD on 5/10/2023 at 11:49 AM       This signature is my attestation that I have personally conducted a microscopic examination (gross only exam if so stated) of the described specimen(s) and rendered the above diagnosis(es).         Problem List:   1) SBO (small bowel obstruction) (CMS-HCC)  2) Abnormal CT of the abdomen  3) Appendix carcinoma (CMS-HCC)  4) Cancer (CMS-HCC)  5) Prostate cancer (CMS-HCC)  6) Acute deep vein thrombosis (DVT) of axillary vein of left upper extremity (CMS-HCC)  7) Carcinomatosis (CMS-HCC)  8) Elevated serum creatinine  9) Pleural effusion, right  10) Acute hypoxemic respiratory failure (CMS-HCC)  11) Hyponatremia    Assessment:      Omental adenocarcinoma   10/22/21 patient began experiencing abdominal pain and distention, constipation, absence of passing gas, nausea and  vomiting.  After symptoms persistent for several days, he contacted his oncologist, Dr. Evelina Schmitz who he follows with for previous history of prostate cancer and she ordered CT scan of abdomen/pelvis  CT scan of abdomen/pelvis on 10/28/21 revealed bowel obstruction with possible mass vs. adhesion at terminal ileum as well as thickening of mesentery concerning for carcinomatosis.  He was sent to the ER for further evaluation, where he was admitted.  He was valuated by heme/onc, GI and surgery. He had CEA of 7.5. He was NPO, given IV fluids and IR was consulted.  He underwent omental biopsy on 10/29. His diet was advanced to clear and he had multiple bowel movements with bowel regimen. He also underwent colonoscopy on 11/1 with biopsy. Pathology was positive for poorly differentiated adenocarcinoma.  He had left chest port placed on 11/5/21 by Dr. Mendoza. He had skin irritation from the glue.  We attempted to obtain PET scan but was denied by insurance.   He had CT scan of chest on 11/10/21 revealing stable pulmonary nodules indeterminate in etiology, filling defect adjacent to his central line catheter likely representing fibrin sheath or thrombus, but no evidence of metastatic disease. He does have glue residue/skin irritation from where his catheter was placed that has been flaking with some itching today. He is not having any issues with swelling or other symptoms that make me concerned for thrombus and we will ensure that this port is functioning correctly and monitor this.  He has discussed with Dr. Schmitz, who is also a friend and they have discussed his diagnosis, prognosis and treatment options and was referred here to proceed with chemotherapy treatment.  He began treatment with FOLFOX on 11/12/21 and consent was signed.  Bevacizumab was added to treatment regimen with his third dose of treatment on 12/10/21.  We proceeded with treatment.  He saw Dr. Lomeli on 1/31/22 for a surgical evaluation. He  had plans for a laparoscopy in future to assess for cytoreductive surgery and HIPEC. We recommended he complete 6 full cycles of FOLFOX and Avastin.   He received cycle #6 of treatment on 5/6/22.   He followed up with Dr. Lomeli in the office on 6/6/22 to discuss surgery and he recommended diagnostic laparoscopy to determine suitability for debulking and HIPEC surgery.   He underwent laparoscopy and had several concerning spots on his diaphragm and had bioppsy of one spot on his right diaphragm during the procedure and pathology was consistent with fibrous and skeletal muscle tissue, no malignancy.   He followed back up with Dr. Lomeli in the office on 7/6/22 to discuss results and he recommended he continue chemotherapy treatment and consider possible repeat laparascopy in 3-4 months to assess suitability for CRS, HIPEC again.  He completed treatment with FOLFOX + Avastin, but with Avastin held for last treatment on 11/3/22.   He tolerated treatment fairly well except for some constipation that was manageable and has improved, some fatigue and nausea 24 hours following treatment that then improved, and some persistent neuropathy making it somewhat difficult to complete fine motor skills with his hands but he is tolerating. He has tried acupuncture with some relief for this.   Restaging CT scans on 11/9/22 revealed stable lung nodules, no new lung nodules or masses, grossly unchanged regions of terminal/distal ileal wall thickening, as well as soft tissue thickening of the appendix, and confluent omental carcinomatosis spanning 11.2 cm that was grossly unchanged.   Underwent a laparoscopy with Dr. Lomeli on 12/13/22.   He had biopsy of his right diaphragm which was consistent with residual disease, poorly differentiated adenocarcinoma, diffuse poorly cohesive type, involving fibrous tissue, but sample was insufficient for molecular/immunologic analysis.   He had telemedicine visit with Dr. Lomeli  again on 12/19/22 and discussed these results and next treatment recommendations. Dr. Lomeli described area of residual disease as more so small areas of abnormality rather than a formed mass which he thinks would be difficult to remove by surgery and he recommends continuing systemic treatment here.   He continued treatment with 5Fu bolus and infusional 5FU with Leucovorin only, held Avastin.   He underwent surgery with Dr. Yepez on 4/27/23 which included an exploratory laparotomy with TILA, right hemicolectomy with staple ileocolic anastomosis, small bowel resection with primary stapled enteroenterostomy, wedge segment of hepatic segment 6 resection of right diaphragm, resection of multiple mesenteric masses, multiple peritonectomies, partial splenectomy, resection of right seminal vesicle, resection and repair of anterior wall of rectum, resection of pelvic mass, HIPEC with mitomycin.   Pathology demonstrated poorly differentiated SRC, more details under pathology section above.   He is recovering well from surgery. He has some occasional fatigue and his BM's have loose from time to time. He did not require a colostomy bag. He still has some neuropathy in his toes but has resolved in his fingers. He last saw Dr. Yepez on 6/2/23 and thought he was doing well, recommended he continue follow up here to discuss further treatment.  Had discussion with patient regarding pathology findings and next steps. Given he had undergone surgery and HIPEC for debulking and was feeling well, I recommended proceeding with monitoring for now with labs, exam, symptom assessments and imaging and patient was agreeable, focusing on his quality of life. He was feeling very well.  He had repeat CT scan of abdomen/pelvis on 8/1/23 which radiologist read as increasing peritoneal carcinomatosis, although I suspected findings may be more related to scar tissue from recent surgery rather than disease progression given he was doing so  well clinically and had no evidence of this otherwise. I suspect radiologist was not aware that he had undergone recent surgery for debulking  He had labs on 7/31/23 including normal CBC, CMP and CEA of 1.4.  Repeat CT scan on 10/30/23 notable for mildly increased peritoneal carcinomatosis, new nonocclusive portal vein thrombus. Labs also on 10/30/23 notable for normal CBC, CMP with BUN slightly elevated at 23 but otherwise normal, and CEA slightly up at 6.5.   Repeat CT scan on 1/2/24 with mildly increased peritoneal nodules, mesenteric fat stranding and retroperitoneal lymph nodes concerning for slight progression of disease, and also notes new right pleural effusion and bibasilar interstitial prominence. Labs on 1/2/24 with CEA slightly elevated at 6.9 and essentially normal CBC and CMP. Discussed these findings with patient today and recommend continuing to monitor, monitoring for any worsening respiratory symptoms and obtaining CT scan of chest prior to next visit, and monitoring for any progressive abdominal pain, distention, bowel changes, etc.  He continues port flushes.  Recent CT with right pleural effusion.    He underwent a thoracentesis on 2/8/24 with 1.1 liters of slightly cloudy yellow pleural fluid removed but cytopathology of right pleural fluid was negative for malignancy   Repeat thoracentesis on 2/19/24 pleural fluid positive for metastatic adenocarcinoma.      Left upper extremity DVT   Diagnosed by ultrasound on 11/29/21  Started on Xarelto  His arm pain has resolved  Discussed advancing activities and he had resumed with weight exercise  Had to hold on exercising following recent laparoscopy but will slowly be resuming  His Xarelto was held for 2 days surrounding laparoscopy but has since been resumed  Continues Xarelto and tolerating well  I recommend he continue Xarelto for now given underlying malignancy and persistent port, as well as history of PE.        Pulmonary Embolus  New on CT scan  of chest in May 2022.  Currently on Xarelto and tolerating well.  Recent CT scan revealed new partial portal vein thrombosis.  Recommend he continue to take Xarelto regularly.    Hypertension  Improved on Lisinopril 10 mg once daily.  Recommend he follow up with PCP for further evaluation and management of this.    Plan:   Completed treatment with cycle # 12 of treatment, cycle #6 of FOLFOX + Bevacizumab on 5/6/22.   Underwent diagnostic laparoscopy with Dr. Lomeli on 6/6/22 with several concerning diaphragm lesions, but negative biopsy of right diaphragm.  Dr. Lomeli recommended continuing chemotherapy treatment and than repeat laparoscopy in 2-3 months to assess suitability for debulking, HIPEC surgery.  Completed 6 more cycles of chemotherapy treatment with FOLFOX + Avastin on 11/3/22.  Restaging scans with similar appearance and repeat laraposcopy with Dr. Lomeli with residual disease, not a candidate for surgery at this time.  Continued treatment with 5-Fu, Leucovorin + Avastin every two weeks, omitting Oxaliplatin from regimen due to neuropathy. Held Avastin with last cycle.  Underwent surgery and HIPEC with Dr. Yepez on 4/27/23.  Follow up scan with signs of possible slight progression with slightly elevated CEA, but symptoms were stable and he was feeling well.  I recommended continuing with observation and did not recommend further chemotherapy, but thought may need to consider in the future for signs of more progressive disease.  Had pleural effusion on recent scan and underwent thoracentesis and cytology was negative, but still with shortness of breath, cough and now some abdominal distention.   Lengthy discussion with Dr. Clinton , the patient, his wife and myself.  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  He has planned paracentesis in the am at [facility], 3/27/24.  He is interested in further therapy and is  willing to go to [facility] for opinion for possible clinical trial with Dr. Jose Capone, if eligible.  I have asked Navigation for assistance with this.  We discussed the role for systemic therapy, although at this time, how well he would tolerate treatment and how well it would be effective was discussed.  He is willing to try anything, at this point.   Start spironolactone 50 mg once in the morning for effusion and edema.  Continue lasix 20 mg once daily for abdominal and lower extremity edeam.   Continue monitoring BP and continue Lisinopril 10 mg once daily.   Follow up with PCP.  Continue Xarelto  Continue follow up with Dr. Lomeli as advised.  Continue follow up with Dr. Yepez as advised.  Use Miralax and MOM for constipation as needed.  Continue acupuncture for neuropathy. Will continue to monitor.  Call PRN for any concerns.  Continue port flushes about every 6 weeks.     Follow up in 2 weeks on for office visit with Kami with labs including CBC and CMP   Orders Placed This Encounter   No orders of the following type(s) were placed in this encounter: Procedures, Lab, Imaging, Referral, Cardiology Procedures, Supplies.       Visit Disposition       Dispositions    Return in about 15 days (around 3/21/2024) for cbc, cmp, office visit and follow up with MD.              40 minutes spent seeing patient,

## 2024-03-07 ENCOUNTER — HOSPITAL ENCOUNTER (OUTPATIENT)
Dept: RADIOLOGY | Facility: HOSPITAL | Age: 59
Discharge: HOME | End: 2024-03-07
Attending: NURSE PRACTITIONER | Admitting: RADIOLOGY
Payer: COMMERCIAL

## 2024-03-07 VITALS
RESPIRATION RATE: 20 BRPM | SYSTOLIC BLOOD PRESSURE: 84 MMHG | DIASTOLIC BLOOD PRESSURE: 53 MMHG | OXYGEN SATURATION: 96 % | HEART RATE: 80 BPM

## 2024-03-07 DIAGNOSIS — C18.1 APPENDIX CARCINOMA (CMS/HCC): ICD-10-CM

## 2024-03-07 PROCEDURE — 36100360 IR PARACENTESIS

## 2024-03-07 PROCEDURE — 0W9G3ZZ DRAINAGE OF PERITONEAL CAVITY, PERCUTANEOUS APPROACH: ICD-10-PCS | Performed by: RADIOLOGY

## 2024-03-07 PROCEDURE — 25000000 HC PHARMACY GENERAL: Performed by: NURSE PRACTITIONER

## 2024-03-07 PROCEDURE — BW40ZZZ ULTRASONOGRAPHY OF ABDOMEN: ICD-10-PCS | Performed by: RADIOLOGY

## 2024-03-07 RX ORDER — LIDOCAINE HYDROCHLORIDE 10 MG/ML
INJECTION, SOLUTION INFILTRATION; PERINEURAL
Status: COMPLETED | OUTPATIENT
Start: 2024-03-07 | End: 2024-03-07

## 2024-03-07 RX ADMIN — LIDOCAINE HYDROCHLORIDE 10 ML: 10 INJECTION, SOLUTION INFILTRATION; PERINEURAL at 10:10

## 2024-03-07 NOTE — POST-PROCEDURE NOTE
Interventional Radiology Brief Postprocedure Note    Des Moran     Attending: SALO Fulton MD    Assistant: none    Diagnosis: ascites    Description of procedure: paracentesis    Contrast: none     Anesthesia:  Local (Lidocaine)    Volume of Lidocaine Utilized (ml): 10ml     Medications: none     Complications: None      Estimated Blood Loss: Estimated Blood Loss: 0-10 ml    Anticoagulation: no restrictions    Specimens: 2750ml chylous peritoneal fluid    Findings: successful paracentesis, vss, specimen discarded, patient discharged home stable by RN following procedure.     3/7/2024 10:28 AM

## 2024-03-07 NOTE — DISCHARGE INSTRUCTIONS
- Rest today following the procedure  - You may resume your normal diet as tolerated  - If you develop fever, chills or drainage from catheter insertion site please call IR  - If you develop sudden chest pain, shortness of breath of dizziness please call you primary physician or go the the closest Emergency Department    Interventional Radiology Contact Information:    [facility]  [phone] between 8am-5pm Monday - Friday  If you need assistance after normal business hours please call the IR physician on call at [phone].    [facility] strongly recommends that you visit a Primary Care Provider (PCP) regularly. Your PCP can help you implement the recommendations we gave you today, coordinate care among your specialists, as well as make sure you are up to date with wellness exams, immunizations and preventive screenings. Your PCP can also help when you are feeling sick, potentially avoiding the need for urgent care or emergency department visits. For these reasons, it is important that you follow up with your PCP at least annually or more often based upon your medical conditions. If you do not have a PCP, please call [phone] ([phone]) or go to Find a Doctor for help with finding one.

## 2024-03-07 NOTE — OR SURGEON
Pre-Procedure patient identification:  I am the primary operating surgeon/proceduralist and I have reviewed the applicable pathology reports and radiology studies for this procedure. I have identified the patient on 03/07/24 at 9:44 AM SALO Fulton

## 2024-03-11 ENCOUNTER — TRANSCRIBE ORDERS (OUTPATIENT)
Dept: SCHEDULING | Age: 59
End: 2024-03-11
Payer: COMMERCIAL

## 2024-03-11 DIAGNOSIS — J90 EXUDATIVE PLEURISY: Primary | ICD-10-CM

## 2024-03-11 DIAGNOSIS — C18.1 MALIGNANT NEOPLASM OF APPENDIX VERMIFORMIS (CMS/HCC): ICD-10-CM

## 2024-04-04 ENCOUNTER — TELEPHONE (OUTPATIENT)
Dept: PRIMARY CARE | Facility: CLINIC | Age: 59
End: 2024-04-04
Payer: COMMERCIAL